# Patient Record
Sex: MALE | Race: WHITE | Employment: PART TIME | URBAN - METROPOLITAN AREA
[De-identification: names, ages, dates, MRNs, and addresses within clinical notes are randomized per-mention and may not be internally consistent; named-entity substitution may affect disease eponyms.]

---

## 2021-01-31 ENCOUNTER — APPOINTMENT (OUTPATIENT)
Dept: GENERAL RADIOLOGY | Age: 41
End: 2021-01-31
Attending: EMERGENCY MEDICINE
Payer: OTHER GOVERNMENT

## 2021-01-31 ENCOUNTER — HOSPITAL ENCOUNTER (EMERGENCY)
Age: 41
Discharge: HOME | End: 2021-01-31
Attending: EMERGENCY MEDICINE
Payer: OTHER GOVERNMENT

## 2021-01-31 VITALS
HEART RATE: 90 BPM | OXYGEN SATURATION: 94 % | RESPIRATION RATE: 16 BRPM | WEIGHT: 273.59 LBS | DIASTOLIC BLOOD PRESSURE: 86 MMHG | SYSTOLIC BLOOD PRESSURE: 131 MMHG | TEMPERATURE: 98.2 F

## 2021-01-31 DIAGNOSIS — S93.402A MODERATE ANKLE SPRAIN, LEFT, INITIAL ENCOUNTER: Primary | ICD-10-CM

## 2021-01-31 PROCEDURE — 73610 X-RAY EXAM OF ANKLE: CPT

## 2021-01-31 PROCEDURE — 96374 THER/PROPH/DIAG INJ IV PUSH: CPT

## 2021-01-31 PROCEDURE — 99283 EMERGENCY DEPT VISIT LOW MDM: CPT

## 2021-01-31 PROCEDURE — 96372 THER/PROPH/DIAG INJ SC/IM: CPT

## 2021-01-31 PROCEDURE — 74011250636 HC RX REV CODE- 250/636: Performed by: EMERGENCY MEDICINE

## 2021-01-31 PROCEDURE — 90791 PSYCH DIAGNOSTIC EVALUATION: CPT

## 2021-01-31 RX ORDER — KETOROLAC TROMETHAMINE 30 MG/ML
30 INJECTION, SOLUTION INTRAMUSCULAR; INTRAVENOUS
Status: COMPLETED | OUTPATIENT
Start: 2021-01-31 | End: 2021-01-31

## 2021-01-31 RX ORDER — NAPROXEN 500 MG/1
500 TABLET ORAL 2 TIMES DAILY WITH MEALS
Qty: 20 TAB | Refills: 0 | Status: SHIPPED | OUTPATIENT
Start: 2021-01-31 | End: 2021-02-10

## 2021-01-31 RX ADMIN — KETOROLAC TROMETHAMINE 30 MG: 30 INJECTION, SOLUTION INTRAMUSCULAR at 15:44

## 2021-01-31 NOTE — ED TRIAGE NOTES
Triage Note: Patient complains of left lateral ankle pain after jumping off a 4 foot stage last night. +swelling noted.

## 2021-01-31 NOTE — ED NOTES
The patient left the Emergency Department ambulatory with crutches, alert and oriented and in no acute distress. The patient was encouraged to call or return to the ED for worsening issues or problems and was encouraged to schedule a follow up appointment for continuing care.      The patient verbalized understanding of discharge instructions and prescriptions, all questions were answered. The patient has no further concerns at this time.

## 2021-02-01 NOTE — ED PROVIDER NOTES
17-year-old male with history of HTN, kidney stones, TBI, presents to the emergency department stating that last night while playing show he jumped off of a approximately 4 foot high stage and landed twisting his left ankle in an inversion type mechanism. Since then he has had progressively worsening ankle swelling and bruising and significant pain with any weightbearing. He states that he took a dose of ibuprofen earlier this morning which slightly improved his symptoms but has not taken anything since then. He notes a remote history of a prior ankle fracture which was treated nonoperatively with a fracture boot. He denies any other injuries head injury or any other medical concerns. Ankle Pain   Pertinent negatives include no numbness, no back pain and no neck pain. Past Medical History:   Diagnosis Date    Hypertension     Kidney stone     TBI (traumatic brain injury) ([facility] [address]        Past Surgical History:   Procedure Laterality Date    HX LITHOTRIPSY           History reviewed. No pertinent family history.     Social History     Socioeconomic History    Marital status:      Spouse name: Not on file    Number of children: Not on file    Years of education: Not on file    Highest education level: Not on file   Occupational History    Not on file   Social Needs    Financial resource strain: Not on file    Food insecurity     Worry: Not on file     Inability: Not on file    Transportation needs     Medical: Not on file     Non-medical: Not on file   Tobacco Use    Smoking status: Never Smoker    Smokeless tobacco: Never Used   Substance and Sexual Activity    Alcohol use: Yes     Comment: monthly    Drug use: Yes     Types: Marijuana    Sexual activity: Not on file   Lifestyle    Physical activity     Days per week: Not on file     Minutes per session: Not on file    Stress: Not on file   Relationships    Social connections     Talks on phone: Not on file     Gets together: Not on file     Attends Judaism service: Not on file     Active member of club or organization: Not on file     Attends meetings of clubs or organizations: Not on file     Relationship status: Not on file    Intimate partner violence     Fear of current or ex partner: Not on file     Emotionally abused: Not on file     Physically abused: Not on file     Forced sexual activity: Not on file   Other Topics Concern    Not on file   Social History Narrative    Not on file         ALLERGIES: Patient has no known allergies. Review of Systems   Constitutional: Positive for activity change. Negative for appetite change, chills and fever. HENT: Negative for congestion, rhinorrhea, sinus pain, sneezing and sore throat. Eyes: Negative for photophobia and visual disturbance. Respiratory: Negative for cough and shortness of breath. Cardiovascular: Negative for chest pain. Gastrointestinal: Negative for abdominal pain, blood in stool, constipation, diarrhea, nausea and vomiting. Genitourinary: Negative for difficulty urinating, dysuria, flank pain, hematuria, penile pain and testicular pain. Musculoskeletal: Positive for arthralgias (Left ankle), gait problem and joint swelling. Negative for back pain, myalgias and neck pain. Skin: Positive for color change. Negative for rash and wound. Neurological: Negative for syncope, weakness, light-headedness, numbness and headaches. Psychiatric/Behavioral: Negative for self-injury and suicidal ideas. All other systems reviewed and are negative. Vitals:    01/31/21 1527 01/31/21 1530 01/31/21 1600   BP: (!) 155/104 (!) 144/96 131/86   Pulse: (!) 101 (!) 102 90   Resp: 16 16 16   Temp: 98.2 °F (36.8 °C)     SpO2: 97% 97% 94%   Weight: 124.1 kg (273 lb 9.5 oz)              Physical Exam  Vitals signs and nursing note reviewed. Constitutional:       General: He is not in acute distress. Appearance: Normal appearance. He is well-developed.  He is not diaphoretic. HENT:      Head: Normocephalic and atraumatic. Nose: Nose normal.   Eyes:      Extraocular Movements: Extraocular movements intact. Conjunctiva/sclera: Conjunctivae normal.      Pupils: Pupils are equal, round, and reactive to light. Neck:      Musculoskeletal: Neck supple. Cardiovascular:      Rate and Rhythm: Normal rate and regular rhythm. Heart sounds: Normal heart sounds. Pulmonary:      Effort: Pulmonary effort is normal.      Breath sounds: Normal breath sounds. Abdominal:      General: There is no distension. Palpations: Abdomen is soft. Tenderness: There is no abdominal tenderness. Musculoskeletal:         General: Swelling, tenderness and signs of injury present. Left knee: Normal.      Left ankle: He exhibits decreased range of motion, swelling and ecchymosis. He exhibits no deformity, no laceration and normal pulse. Tenderness. Lateral malleolus and AITFL tenderness found. No medial malleolus, no head of 5th metatarsal and no proximal fibula tenderness found. Achilles tendon normal.   Skin:     General: Skin is warm and dry. Neurological:      General: No focal deficit present. Mental Status: He is alert and oriented to person, place, and time. Cranial Nerves: No cranial nerve deficit. Sensory: No sensory deficit. Motor: No weakness. Coordination: Coordination normal.          MDM   42-year-old male presents with swelling and ecchymosis predominantly over the lateral malleolus with decreased range of motion of left ankle concerning for moderate ankle sprain versus ankle fracture. Patient is afebrile with vital signs stable. Given dose of Toradol in the ED. X-rays of left ankle were reviewed by myself and read by radiology showing no evidence of acute fracture or dislocation. Likely moderate ankle sprain. He was given ankle brace and crutches and Rx Naprosyn.   He was recommended range of motion exercises and gradual reintroduction of normal weightbearing. Recommended orthopedic follow-up for further evaluation as needed despite these medications and time. This plan was discussed with patient at the bedside and he stated both understanding and agreement.     Procedures

## 2021-07-15 ENCOUNTER — APPOINTMENT (OUTPATIENT)
Dept: CT IMAGING | Age: 41
End: 2021-07-15
Attending: EMERGENCY MEDICINE
Payer: MEDICARE

## 2021-07-15 ENCOUNTER — HOSPITAL ENCOUNTER (EMERGENCY)
Age: 41
Discharge: HOME | End: 2021-07-16
Attending: EMERGENCY MEDICINE
Payer: MEDICARE

## 2021-07-15 DIAGNOSIS — R41.3 MEMORY DIFFICULTIES: ICD-10-CM

## 2021-07-15 DIAGNOSIS — Z87.820 HISTORY OF TRAUMATIC BRAIN INJURY: ICD-10-CM

## 2021-07-15 DIAGNOSIS — R40.4 TRANSIENT ALTERATION OF AWARENESS: Primary | ICD-10-CM

## 2021-07-15 PROCEDURE — 99283 EMERGENCY DEPT VISIT LOW MDM: CPT

## 2021-07-15 PROCEDURE — 70450 CT HEAD/BRAIN W/O DYE: CPT

## 2021-07-16 VITALS
RESPIRATION RATE: 16 BRPM | BODY MASS INDEX: 39.33 KG/M2 | WEIGHT: 274.69 LBS | SYSTOLIC BLOOD PRESSURE: 142 MMHG | OXYGEN SATURATION: 95 % | HEIGHT: 70 IN | DIASTOLIC BLOOD PRESSURE: 100 MMHG | TEMPERATURE: 98.3 F | HEART RATE: 91 BPM

## 2021-07-16 NOTE — ED TRIAGE NOTES
Pt states that he has a hx of a TBI in 2005 with some memory loss. Pt states that memory issues have become worse in the past 2 months and his PCP wanted him to be scanned. No new trauma.

## 2021-07-16 NOTE — ED PROVIDER NOTES
The history is provided by the patient. Memory Loss   This is a new problem. Episode onset: last 2 months short term memory difficulties have worsened and recently he had an episode where he was disoriented while dricing and did not know where he was, had to use phone GPS to get home. The problem has been resolved. Pertinent negatives include no somnolence, no seizures, no unresponsiveness and no hallucinations. Mental status baseline is normal.  Risk factors: history of TBI. Past Medical History:   Diagnosis Date    Hypertension     Kidney stone     TBI (traumatic brain injury) ([facility] [address]        Past Surgical History:   Procedure Laterality Date    HX LITHOTRIPSY           History reviewed. No pertinent family history. Social History     Socioeconomic History    Marital status:      Spouse name: Not on file    Number of children: Not on file    Years of education: Not on file    Highest education level: Not on file   Occupational History    Not on file   Tobacco Use    Smoking status: Never Smoker    Smokeless tobacco: Never Used   Substance and Sexual Activity    Alcohol use: Yes     Comment: monthly    Drug use: Yes     Types: Marijuana    Sexual activity: Not on file   Other Topics Concern    Not on file   Social History Narrative    Not on file     Social Determinants of Health     Financial Resource Strain:     Difficulty of Paying Living Expenses:    Food Insecurity:     Worried About Running Out of Food in the Last Year:     [address] in the Last Year:    Transportation Needs:     Lack of Transportation (Medical):      Lack of Transportation (Non-Medical):    Physical Activity:     Days of Exercise per Week:     Minutes of Exercise per Session:    Stress:     Feeling of Stress :    Social Connections:     Frequency of Communication with Friends and Family:     Frequency of Social Gatherings with Friends and Family:     Attends Gnosticist Services:     Active Member of Clubs or Organizations:     Attends Club or Organization Meetings:     Marital Status:    Intimate Partner Violence:     Fear of Current or Ex-Partner:     Emotionally Abused:     Physically Abused:     Sexually Abused: ALLERGIES: Patient has no known allergies. Review of Systems   Neurological: Negative for seizures. Psychiatric/Behavioral: Positive for memory loss. Negative for hallucinations. All other systems reviewed and are negative. Vitals:    07/15/21 2314 07/15/21 2315 07/15/21 2345 07/15/21 2356   BP:  (!) 158/100 (!) 142/100    Pulse:  91     Resp:  16     Temp:  98.3 °F (36.8 °C)     SpO2:  95% 95% 95%   Weight: 124.6 kg (274 lb 11.1 oz)      Height: 5' 10\" (1.778 m)               Physical Exam  Vitals and nursing note reviewed. Constitutional:       General: He is not in acute distress. Appearance: He is well-developed. HENT:      Head: Normocephalic and atraumatic. Eyes:      Extraocular Movements: Extraocular movements intact. Right eye: No nystagmus. Left eye: No nystagmus. Conjunctiva/sclera: Conjunctivae normal.   Neck:      Trachea: No tracheal deviation. Cardiovascular:      Rate and Rhythm: Normal rate and regular rhythm. Pulmonary:      Effort: Pulmonary effort is normal. No respiratory distress. Abdominal:      General: There is no distension. Musculoskeletal:         General: No deformity. Normal range of motion. Cervical back: Neck supple. Skin:     General: Skin is warm and dry. Neurological:      Mental Status: He is alert and oriented to person, place, and time. Cranial Nerves: Cranial nerves are intact. No cranial nerve deficit. Sensory: Sensation is intact. Motor: Motor function is intact. Coordination: Finger-Nose-Finger Test and Heel to [place] keshawn Test normal. Rapid alternating movements normal.      Gait: Gait is intact.    Psychiatric:         Behavior: Behavior normal.          MDM     41 y.o. male presents with memory difficulties which have progressively worsened over the last few weeks. He has had some headaches as well. He had a recent episode of disorientation while driving and he talked to his PCP who recommended neuroimaging. With history of TBI what he likely needs is MR brain wwo contrast for more detailed evaluation and neurology follow up. 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 Plan to follow up with PCP as needed and return precautions discussed for worsening or new concerning symptoms.      Procedures

## 2021-12-09 ENCOUNTER — HOSPITAL ENCOUNTER (INPATIENT)
Age: 41
LOS: 7 days | Discharge: HOME | DRG: 177 | End: 2021-12-16
Attending: EMERGENCY MEDICINE | Admitting: FAMILY MEDICINE
Payer: MEDICARE

## 2021-12-09 ENCOUNTER — APPOINTMENT (OUTPATIENT)
Dept: GENERAL RADIOLOGY | Age: 41
DRG: 177 | End: 2021-12-09
Attending: EMERGENCY MEDICINE
Payer: MEDICARE

## 2021-12-09 DIAGNOSIS — R09.02 HYPOXIA: ICD-10-CM

## 2021-12-09 DIAGNOSIS — U07.1 COVID-19: Primary | ICD-10-CM

## 2021-12-09 DIAGNOSIS — E11.9 NEW ONSET TYPE 2 DIABETES MELLITUS (HCC): ICD-10-CM

## 2021-12-09 PROBLEM — R06.02 SOB (SHORTNESS OF BREATH): Status: ACTIVE | Noted: 2021-12-09

## 2021-12-09 LAB
ALBUMIN SERPL-MCNC: 3.4 G/DL (ref 3.5–5)
ALBUMIN/GLOB SERPL: 0.9 {RATIO} (ref 1.1–2.2)
ALP SERPL-CCNC: 80 U/L (ref 45–117)
ALT SERPL-CCNC: 76 U/L (ref 12–78)
ANION GAP SERPL CALC-SCNC: 11 MMOL/L (ref 5–15)
AST SERPL-CCNC: 57 U/L (ref 15–37)
BASOPHILS # BLD: 0 K/UL (ref 0–0.1)
BASOPHILS NFR BLD: 0 % (ref 0–1)
BILIRUB SERPL-MCNC: 0.3 MG/DL (ref 0.2–1)
BUN SERPL-MCNC: 14 MG/DL (ref 6–20)
BUN/CREAT SERPL: 14 (ref 12–20)
CALCIUM SERPL-MCNC: 8.2 MG/DL (ref 8.5–10.1)
CHLORIDE SERPL-SCNC: 94 MMOL/L (ref 97–108)
CO2 SERPL-SCNC: 25 MMOL/L (ref 21–32)
COMMENT, HOLDF: NORMAL
COVID-19 RAPID TEST, COVR: DETECTED
CREAT SERPL-MCNC: 0.99 MG/DL (ref 0.7–1.3)
CRP SERPL-MCNC: 9.09 MG/DL
D DIMER PPP FEU-MCNC: 0.36 MG/L FEU (ref 0–0.65)
DIFFERENTIAL METHOD BLD: ABNORMAL
EOSINOPHIL # BLD: 0 K/UL (ref 0–0.4)
EOSINOPHIL NFR BLD: 0 % (ref 0–7)
ERYTHROCYTE [DISTWIDTH] IN BLOOD BY AUTOMATED COUNT: 13.2 % (ref 11.5–14.5)
FERRITIN SERPL-MCNC: 512 NG/ML (ref 26–388)
FIBRINOGEN PPP-MCNC: 485 MG/DL (ref 200–475)
GLOBULIN SER CALC-MCNC: 4 G/DL (ref 2–4)
GLUCOSE SERPL-MCNC: 198 MG/DL (ref 65–100)
HCT VFR BLD AUTO: 40.8 % (ref 36.6–50.3)
HGB BLD-MCNC: 13.8 G/DL (ref 12.1–17)
IMM GRANULOCYTES # BLD AUTO: 0 K/UL (ref 0–0.04)
IMM GRANULOCYTES NFR BLD AUTO: 1 % (ref 0–0.5)
LDH SERPL L TO P-CCNC: 350 U/L (ref 85–241)
LYMPHOCYTES # BLD: 1 K/UL (ref 0.8–3.5)
LYMPHOCYTES NFR BLD: 17 % (ref 12–49)
MAGNESIUM SERPL-MCNC: 2 MG/DL (ref 1.6–2.4)
MCH RBC QN AUTO: 27.9 PG (ref 26–34)
MCHC RBC AUTO-ENTMCNC: 33.8 G/DL (ref 30–36.5)
MCV RBC AUTO: 82.6 FL (ref 80–99)
MONOCYTES # BLD: 0.2 K/UL (ref 0–1)
MONOCYTES NFR BLD: 4 % (ref 5–13)
NEUTS SEG # BLD: 4.7 K/UL (ref 1.8–8)
NEUTS SEG NFR BLD: 78 % (ref 32–75)
NRBC # BLD: 0 K/UL (ref 0–0.01)
NRBC BLD-RTO: 0 PER 100 WBC
PLATELET # BLD AUTO: 220 K/UL (ref 150–400)
PMV BLD AUTO: 9.9 FL (ref 8.9–12.9)
POTASSIUM SERPL-SCNC: 3.8 MMOL/L (ref 3.5–5.1)
PROCALCITONIN SERPL-MCNC: 0.07 NG/ML
PROT SERPL-MCNC: 7.4 G/DL (ref 6.4–8.2)
RBC # BLD AUTO: 4.94 M/UL (ref 4.1–5.7)
SAMPLES BEING HELD,HOLD: NORMAL
SODIUM SERPL-SCNC: 130 MMOL/L (ref 136–145)
SOURCE, COVRS: ABNORMAL
TROPONIN-HIGH SENSITIVITY: 12 NG/L (ref 0–76)
WBC # BLD AUTO: 6 K/UL (ref 4.1–11.1)

## 2021-12-09 PROCEDURE — 96374 THER/PROPH/DIAG INJ IV PUSH: CPT

## 2021-12-09 PROCEDURE — 83615 LACTATE (LD) (LDH) ENZYME: CPT

## 2021-12-09 PROCEDURE — 74011250637 HC RX REV CODE- 250/637: Performed by: STUDENT IN AN ORGANIZED HEALTH CARE EDUCATION/TRAINING PROGRAM

## 2021-12-09 PROCEDURE — 65660000000 HC RM CCU STEPDOWN

## 2021-12-09 PROCEDURE — 84484 ASSAY OF TROPONIN QUANT: CPT

## 2021-12-09 PROCEDURE — 85384 FIBRINOGEN ACTIVITY: CPT

## 2021-12-09 PROCEDURE — 74011250637 HC RX REV CODE- 250/637: Performed by: INTERNAL MEDICINE

## 2021-12-09 PROCEDURE — 96375 TX/PRO/DX INJ NEW DRUG ADDON: CPT

## 2021-12-09 PROCEDURE — 80053 COMPREHEN METABOLIC PANEL: CPT

## 2021-12-09 PROCEDURE — 83735 ASSAY OF MAGNESIUM: CPT

## 2021-12-09 PROCEDURE — 74011250636 HC RX REV CODE- 250/636: Performed by: EMERGENCY MEDICINE

## 2021-12-09 PROCEDURE — 74011250637 HC RX REV CODE- 250/637: Performed by: FAMILY MEDICINE

## 2021-12-09 PROCEDURE — 74011250636 HC RX REV CODE- 250/636: Performed by: FAMILY MEDICINE

## 2021-12-09 PROCEDURE — 93005 ELECTROCARDIOGRAM TRACING: CPT

## 2021-12-09 PROCEDURE — 99284 EMERGENCY DEPT VISIT MOD MDM: CPT

## 2021-12-09 PROCEDURE — 86140 C-REACTIVE PROTEIN: CPT

## 2021-12-09 PROCEDURE — 71045 X-RAY EXAM CHEST 1 VIEW: CPT

## 2021-12-09 PROCEDURE — 84145 PROCALCITONIN (PCT): CPT

## 2021-12-09 PROCEDURE — 85025 COMPLETE CBC W/AUTO DIFF WBC: CPT

## 2021-12-09 PROCEDURE — 87635 SARS-COV-2 COVID-19 AMP PRB: CPT

## 2021-12-09 PROCEDURE — 85379 FIBRIN DEGRADATION QUANT: CPT

## 2021-12-09 PROCEDURE — 82728 ASSAY OF FERRITIN: CPT

## 2021-12-09 RX ORDER — LANOLIN ALCOHOL/MO/W.PET/CERES
6 CREAM (GRAM) TOPICAL
Status: DISCONTINUED | OUTPATIENT
Start: 2021-12-09 | End: 2021-12-16 | Stop reason: HOSPADM

## 2021-12-09 RX ORDER — KETOROLAC TROMETHAMINE 30 MG/ML
15 INJECTION, SOLUTION INTRAMUSCULAR; INTRAVENOUS
Status: COMPLETED | OUTPATIENT
Start: 2021-12-09 | End: 2021-12-09

## 2021-12-09 RX ORDER — GUAIFENESIN 100 MG/5ML
100 SOLUTION ORAL
Status: DISCONTINUED | OUTPATIENT
Start: 2021-12-09 | End: 2021-12-10

## 2021-12-09 RX ORDER — ACETAMINOPHEN 500 MG
1000 TABLET ORAL
Status: COMPLETED | OUTPATIENT
Start: 2021-12-09 | End: 2021-12-09

## 2021-12-09 RX ORDER — DEXAMETHASONE SODIUM PHOSPHATE 4 MG/ML
6 INJECTION, SOLUTION INTRA-ARTICULAR; INTRALESIONAL; INTRAMUSCULAR; INTRAVENOUS; SOFT TISSUE ONCE
Status: COMPLETED | OUTPATIENT
Start: 2021-12-09 | End: 2021-12-09

## 2021-12-09 RX ORDER — SODIUM CHLORIDE 0.9 % (FLUSH) 0.9 %
5-40 SYRINGE (ML) INJECTION EVERY 8 HOURS
Status: DISCONTINUED | OUTPATIENT
Start: 2021-12-09 | End: 2021-12-16 | Stop reason: HOSPADM

## 2021-12-09 RX ORDER — OXYCODONE HYDROCHLORIDE 5 MG/1
5 TABLET ORAL
Status: DISCONTINUED | OUTPATIENT
Start: 2021-12-09 | End: 2021-12-10

## 2021-12-09 RX ORDER — LISINOPRIL 5 MG/1
5 TABLET ORAL DAILY
Status: DISCONTINUED | OUTPATIENT
Start: 2021-12-10 | End: 2021-12-16 | Stop reason: HOSPADM

## 2021-12-09 RX ORDER — ONDANSETRON 4 MG/1
4 TABLET, ORALLY DISINTEGRATING ORAL
Status: DISCONTINUED | OUTPATIENT
Start: 2021-12-09 | End: 2021-12-16 | Stop reason: HOSPADM

## 2021-12-09 RX ORDER — VENLAFAXINE HYDROCHLORIDE 150 MG/1
300 CAPSULE, EXTENDED RELEASE ORAL
Status: DISCONTINUED | OUTPATIENT
Start: 2021-12-10 | End: 2021-12-16 | Stop reason: HOSPADM

## 2021-12-09 RX ORDER — ONDANSETRON 2 MG/ML
4 INJECTION INTRAMUSCULAR; INTRAVENOUS
Status: DISCONTINUED | OUTPATIENT
Start: 2021-12-09 | End: 2021-12-16 | Stop reason: HOSPADM

## 2021-12-09 RX ORDER — SODIUM CHLORIDE 0.9 % (FLUSH) 0.9 %
5-40 SYRINGE (ML) INJECTION AS NEEDED
Status: DISCONTINUED | OUTPATIENT
Start: 2021-12-09 | End: 2021-12-16 | Stop reason: HOSPADM

## 2021-12-09 RX ORDER — ACETAMINOPHEN 650 MG/1
650 SUPPOSITORY RECTAL
Status: DISCONTINUED | OUTPATIENT
Start: 2021-12-09 | End: 2021-12-16 | Stop reason: HOSPADM

## 2021-12-09 RX ORDER — MORPHINE SULFATE 4 MG/ML
4 INJECTION INTRAVENOUS
Status: COMPLETED | OUTPATIENT
Start: 2021-12-09 | End: 2021-12-09

## 2021-12-09 RX ORDER — ENOXAPARIN SODIUM 100 MG/ML
40 INJECTION SUBCUTANEOUS EVERY 12 HOURS
Status: DISCONTINUED | OUTPATIENT
Start: 2021-12-09 | End: 2021-12-16 | Stop reason: HOSPADM

## 2021-12-09 RX ORDER — ACETAMINOPHEN 325 MG/1
650 TABLET ORAL
Status: DISCONTINUED | OUTPATIENT
Start: 2021-12-09 | End: 2021-12-16 | Stop reason: HOSPADM

## 2021-12-09 RX ORDER — KETOROLAC TROMETHAMINE 30 MG/ML
30 INJECTION, SOLUTION INTRAMUSCULAR; INTRAVENOUS
Status: DISPENSED | OUTPATIENT
Start: 2021-12-09 | End: 2021-12-14

## 2021-12-09 RX ORDER — SODIUM CHLORIDE 9 MG/ML
150 INJECTION, SOLUTION INTRAVENOUS ONCE
Status: COMPLETED | OUTPATIENT
Start: 2021-12-09 | End: 2021-12-09

## 2021-12-09 RX ADMIN — MORPHINE SULFATE 4 MG: 4 INJECTION INTRAVENOUS at 11:45

## 2021-12-09 RX ADMIN — ENOXAPARIN SODIUM 40 MG: 100 INJECTION SUBCUTANEOUS at 21:30

## 2021-12-09 RX ADMIN — Medication 10 ML: at 21:32

## 2021-12-09 RX ADMIN — KETOROLAC TROMETHAMINE 15 MG: 30 INJECTION, SOLUTION INTRAMUSCULAR; INTRAVENOUS at 11:44

## 2021-12-09 RX ADMIN — SODIUM CHLORIDE 150 ML/HR: 9 INJECTION, SOLUTION INTRAVENOUS at 11:46

## 2021-12-09 RX ADMIN — OXYCODONE 5 MG: 5 TABLET ORAL at 21:30

## 2021-12-09 RX ADMIN — DEXAMETHASONE SODIUM PHOSPHATE 6 MG: 4 INJECTION, SOLUTION INTRAMUSCULAR; INTRAVENOUS at 11:44

## 2021-12-09 RX ADMIN — KETOROLAC TROMETHAMINE 30 MG: 30 INJECTION, SOLUTION INTRAMUSCULAR; INTRAVENOUS at 23:32

## 2021-12-09 RX ADMIN — GUAIFENESIN 100 MG: 200 SOLUTION ORAL at 23:32

## 2021-12-09 RX ADMIN — ACETAMINOPHEN 1000 MG: 500 TABLET ORAL at 17:11

## 2021-12-09 RX ADMIN — Medication 6 MG: at 23:32

## 2021-12-09 NOTE — ED NOTES
TRANSFER - OUT REPORT:    Verbal report given to Concepcion Miller RN(name) on [employer]  being transferred to 216(unit) for routine progression of care       Report consisted of patients Situation, Background, Assessment and   Recommendations(SBAR). Information from the following report(s) SBAR, ED Summary, [facility] and Recent Results was reviewed with the receiving nurse. Lines:   Peripheral IV 12/09/21 Left Hand (Active)   Site Assessment Clean, dry, & intact 12/09/21 1105   Phlebitis Assessment 0 12/09/21 1105   Infiltration Assessment 0 12/09/21 1105   Dressing Status Clean, dry, & intact 12/09/21 1105   Hub Color/Line Status Pink 12/09/21 1105        Opportunity for questions and clarification was provided.       Patient transported with:  [facility]

## 2021-12-09 NOTE — ED PROVIDER NOTES
Patient is a 44-year-old male with past medical history significant for hypertension, renal stones and prior TBI who presents emergency department for about a week of upper respiratory symptoms and severe myalgias. He states he is not vaccinated. He states he is not against getting vaccine he just never was able to set it up. States his son had confirmed Covid as well. Patient states that he has been feeling short of breath but that this got quite a bit worse last night. He states he had significant difficulty today prompting him to come to the emergency department. Past Medical History:   Diagnosis Date    Hypertension     Kidney stone     TBI (traumatic brain injury) ([facility] [address]        Past Surgical History:   Procedure Laterality Date    HX LITHOTRIPSY           History reviewed. No pertinent family history. Social History     Socioeconomic History    Marital status:      Spouse name: Not on file    Number of children: Not on file    Years of education: Not on file    Highest education level: Not on file   Occupational History    Not on file   Tobacco Use    Smoking status: Never Smoker    Smokeless tobacco: Never Used   Substance and Sexual Activity    Alcohol use: Yes     Comment: monthly    Drug use: Yes     Types: Marijuana    Sexual activity: Not on file   Other Topics Concern    Not on file   Social History Narrative    Not on file     Social Determinants of Health     Financial Resource Strain:     Difficulty of Paying Living Expenses: Not on file   Food Insecurity:     Worried About Running Out of Food in the Last Year: Not on file    [place] of Food in the Last Year: Not on file   Transportation Needs:     Lack of Transportation (Medical): Not on file    Lack of Transportation (Non-Medical):  Not on file   Physical Activity:     Days of Exercise per Week: Not on file    Minutes of Exercise per Session: Not on file   Stress:     Feeling of Stress : Not on file Social Connections:     Frequency of Communication with Friends and Family: Not on file    Frequency of Social Gatherings with Friends and Family: Not on file    Attends Yazidism Services: Not on file    Active Member of Clubs or Organizations: Not on file    Attends Club or Organization Meetings: Not on file    Marital Status: Not on file   Intimate Partner Violence:     Fear of Current or Ex-Partner: Not on file    Emotionally Abused: Not on file    Physically Abused: Not on file    Sexually Abused: Not on file   Housing Stability:     Unable to Pay for Housing in the Last Year: Not on file    Number of [place] in the Last Year: Not on file    Unstable Housing in the Last Year: Not on file         ALLERGIES: Patient has no known allergies. Review of Systems   Constitutional: Positive for activity change, appetite change, chills, fatigue and fever. HENT: Negative for drooling. Eyes: Negative for photophobia. Respiratory: Positive for cough and shortness of breath. Cardiovascular: Negative for chest pain and leg swelling. Gastrointestinal: Negative for abdominal pain and vomiting. Genitourinary: Negative for dysuria. Musculoskeletal: Positive for myalgias. Skin: Negative for rash. Allergic/Immunologic: Negative for immunocompromised state. Neurological: Negative. Negative for seizures and syncope. Hematological: Does not bruise/bleed easily. Psychiatric/Behavioral: Negative. Vitals:    12/09/21 1028 12/09/21 1032   BP: 123/74    Pulse: (!) 104    Resp: 28    Temp: 99.4 °F (37.4 °C)    SpO2: (!) 88% 93%   Weight: 122.2 kg (269 lb 6.4 oz)    Height: 5' 10\" (1.778 m)             Physical Exam  Vitals and nursing note reviewed. Constitutional:       Appearance: Normal appearance. He is ill-appearing. He is not toxic-appearing or diaphoretic. HENT:      Head: Normocephalic and atraumatic.       Left Ear: External ear normal.      Nose:      Comments: Mask in place  Eyes:      General: No scleral icterus. Cardiovascular:      Rate and Rhythm: Normal rate. Heart sounds: No friction rub. Pulmonary:      Effort: Respiratory distress present. Breath sounds: Rhonchi and rales present. Abdominal:      Palpations: Abdomen is soft. Musculoskeletal:      Cervical back: Neck supple. Right lower leg: No edema. Left lower leg: No edema. Skin:     General: Skin is warm and dry. Neurological:      Mental Status: He is alert and oriented to person, place, and time. Psychiatric:         Mood and Affect: Mood normal.         Behavior: Behavior normal.         Thought Content: Thought content normal.         Judgment: Judgment normal.          [facility]  ED Course as of 12/09/21 1145   Thu Dec 09, 2021   1137 EKG obtained at 1106 showing sinus rhythm, rate 98, normal intervals, no acute appearing findings.  [JE]      ED Course User Index  [JE] Dafne Jiménez MD       Critical Care  Performed by: Dafne Jiménez MD  Authorized by: Dafne Jiménez MD     Critical care provider statement:     Critical care time (minutes):  35    Critical care time was exclusive of:  Separately billable procedures and treating other patients    Critical care was necessary to treat or prevent imminent or life-threatening deterioration of the following conditions:  Respiratory failure    Critical care was time spent personally by me on the following activities:  Ordering and review of laboratory studies, ordering and review of radiographic studies, ordering and performing treatments and interventions, development of treatment plan with patient or surrogate, re-evaluation of patient's condition, pulse oximetry, evaluation of patient's response to treatment, examination of patient and obtaining history from patient or surrogate    I assumed direction of critical care for this patient from another provider in my specialty: no            Perfect Serve Consult for Admission  11:45 AM    ED Room Number: SER07/07  Patient Name and age:  Valdez Watson 39 y.o.  male  Working Diagnosis:   1. COVID-19    2. Hypoxia        COVID-19 Suspicion:  yes  Sepsis present:  no  Reassessment needed: no  Code Status:  Full Code  Readmission: no  Isolation Requirements:  yes  Recommended Level of Care:  telemetry  Department:[facility] Adult ED - 21   Other: Patient is a 70-year-old male with past medical history significant for hypertension who presents to the ER 1 week after starting to have symptoms of upper respiratory infection after known exposure to sun with Covid infection. Patient hypoxic upon arrival requiring nasal cannula O2. Covid pneumonia on chest x-ray. Given Decadron.

## 2021-12-09 NOTE — ED TRIAGE NOTES
Pt arrives with concern for COVId as his son tested + and pt has had cough and SOB x 1 week. Pt is not vaccinated for COVID.

## 2021-12-10 LAB
ALBUMIN SERPL-MCNC: 3.2 G/DL (ref 3.5–5)
ALBUMIN/GLOB SERPL: 0.7 {RATIO} (ref 1.1–2.2)
ALP SERPL-CCNC: 80 U/L (ref 45–117)
ALT SERPL-CCNC: 70 U/L (ref 12–78)
ANION GAP SERPL CALC-SCNC: 5 MMOL/L (ref 5–15)
APTT PPP: 29.3 SEC (ref 22.1–31)
AST SERPL-CCNC: 56 U/L (ref 15–37)
BASOPHILS # BLD: 0 K/UL (ref 0–0.1)
BASOPHILS NFR BLD: 1 % (ref 0–1)
BILIRUB SERPL-MCNC: 0.3 MG/DL (ref 0.2–1)
BUN SERPL-MCNC: 15 MG/DL (ref 6–20)
BUN/CREAT SERPL: 16 (ref 12–20)
CALCIUM SERPL-MCNC: 9.1 MG/DL (ref 8.5–10.1)
CHLORIDE SERPL-SCNC: 100 MMOL/L (ref 97–108)
CO2 SERPL-SCNC: 26 MMOL/L (ref 21–32)
CREAT SERPL-MCNC: 0.92 MG/DL (ref 0.7–1.3)
CRP SERPL-MCNC: 6.59 MG/DL (ref 0–0.6)
D DIMER PPP FEU-MCNC: 0.27 MG/L FEU (ref 0–0.65)
DIFFERENTIAL METHOD BLD: NORMAL
EOSINOPHIL # BLD: 0 K/UL (ref 0–0.4)
EOSINOPHIL NFR BLD: 0 % (ref 0–7)
ERYTHROCYTE [DISTWIDTH] IN BLOOD BY AUTOMATED COUNT: 13 % (ref 11.5–14.5)
EST. AVERAGE GLUCOSE BLD GHB EST-MCNC: 177 MG/DL
GLOBULIN SER CALC-MCNC: 4.5 G/DL (ref 2–4)
GLUCOSE BLD STRIP.AUTO-MCNC: 251 MG/DL (ref 65–117)
GLUCOSE SERPL-MCNC: 186 MG/DL (ref 65–100)
HBA1C MFR BLD: 7.8 % (ref 4–5.6)
HCT VFR BLD AUTO: 41.3 % (ref 36.6–50.3)
HGB BLD-MCNC: 13.9 G/DL (ref 12.1–17)
IMM GRANULOCYTES # BLD AUTO: 0 K/UL (ref 0–0.04)
IMM GRANULOCYTES NFR BLD AUTO: 0 % (ref 0–0.5)
INR PPP: 1 (ref 0.9–1.1)
LYMPHOCYTES # BLD: 1.3 K/UL (ref 0.8–3.5)
LYMPHOCYTES NFR BLD: 23 % (ref 12–49)
MAGNESIUM SERPL-MCNC: 2.4 MG/DL (ref 1.6–2.4)
MCH RBC QN AUTO: 28.1 PG (ref 26–34)
MCHC RBC AUTO-ENTMCNC: 33.7 G/DL (ref 30–36.5)
MCV RBC AUTO: 83.4 FL (ref 80–99)
MONOCYTES # BLD: 0.4 K/UL (ref 0–1)
MONOCYTES NFR BLD: 6 % (ref 5–13)
NEUTS SEG # BLD: 4.1 K/UL (ref 1.8–8)
NEUTS SEG NFR BLD: 70 % (ref 32–75)
NRBC # BLD: 0 K/UL (ref 0–0.01)
NRBC BLD-RTO: 0 PER 100 WBC
PLATELET # BLD AUTO: 234 K/UL (ref 150–400)
PMV BLD AUTO: 9.5 FL (ref 8.9–12.9)
POTASSIUM SERPL-SCNC: 4.1 MMOL/L (ref 3.5–5.1)
PROT SERPL-MCNC: 7.7 G/DL (ref 6.4–8.2)
PROTHROMBIN TIME: 10 SEC (ref 9–11.1)
RBC # BLD AUTO: 4.95 M/UL (ref 4.1–5.7)
SERVICE CMNT-IMP: ABNORMAL
SODIUM SERPL-SCNC: 131 MMOL/L (ref 136–145)
THERAPEUTIC RANGE,PTTT: NORMAL SECS (ref 58–77)
WBC # BLD AUTO: 5.9 K/UL (ref 4.1–11.1)

## 2021-12-10 PROCEDURE — 36415 COLL VENOUS BLD VENIPUNCTURE: CPT

## 2021-12-10 PROCEDURE — 83735 ASSAY OF MAGNESIUM: CPT

## 2021-12-10 PROCEDURE — 74011250637 HC RX REV CODE- 250/637: Performed by: HOSPITALIST

## 2021-12-10 PROCEDURE — 80053 COMPREHEN METABOLIC PANEL: CPT

## 2021-12-10 PROCEDURE — 74011250637 HC RX REV CODE- 250/637: Performed by: FAMILY MEDICINE

## 2021-12-10 PROCEDURE — 74011250637 HC RX REV CODE- 250/637: Performed by: INTERNAL MEDICINE

## 2021-12-10 PROCEDURE — 86140 C-REACTIVE PROTEIN: CPT

## 2021-12-10 PROCEDURE — XW0DXM6 INTRODUCTION OF BARICITINIB INTO MOUTH AND PHARYNX, EXTERNAL APPROACH, NEW TECHNOLOGY GROUP 6: ICD-10-PCS | Performed by: FAMILY MEDICINE

## 2021-12-10 PROCEDURE — 85610 PROTHROMBIN TIME: CPT

## 2021-12-10 PROCEDURE — 74011250636 HC RX REV CODE- 250/636: Performed by: FAMILY MEDICINE

## 2021-12-10 PROCEDURE — 85025 COMPLETE CBC W/AUTO DIFF WBC: CPT

## 2021-12-10 PROCEDURE — 85379 FIBRIN DEGRADATION QUANT: CPT

## 2021-12-10 PROCEDURE — 83036 HEMOGLOBIN GLYCOSYLATED A1C: CPT

## 2021-12-10 PROCEDURE — 74011636637 HC RX REV CODE- 636/637: Performed by: HOSPITALIST

## 2021-12-10 PROCEDURE — 65660000000 HC RM CCU STEPDOWN

## 2021-12-10 PROCEDURE — 82962 GLUCOSE BLOOD TEST: CPT

## 2021-12-10 PROCEDURE — 85730 THROMBOPLASTIN TIME PARTIAL: CPT

## 2021-12-10 RX ORDER — CODEINE PHOSPHATE AND GUAIFENESIN 10; 100 MG/5ML; MG/5ML
10 SOLUTION ORAL
Status: DISCONTINUED | OUTPATIENT
Start: 2021-12-10 | End: 2021-12-16 | Stop reason: HOSPADM

## 2021-12-10 RX ORDER — INSULIN LISPRO 100 [IU]/ML
INJECTION, SOLUTION INTRAVENOUS; SUBCUTANEOUS
Status: DISCONTINUED | OUTPATIENT
Start: 2021-12-10 | End: 2021-12-16 | Stop reason: HOSPADM

## 2021-12-10 RX ORDER — CODEINE PHOSPHATE AND GUAIFENESIN 10; 100 MG/5ML; MG/5ML
10 SOLUTION ORAL
Status: DISCONTINUED | OUTPATIENT
Start: 2021-12-10 | End: 2021-12-10 | Stop reason: SDUPTHER

## 2021-12-10 RX ORDER — DEXTROSE 50 % IN WATER (D50W) INTRAVENOUS SYRINGE
12.5-25 AS NEEDED
Status: DISCONTINUED | OUTPATIENT
Start: 2021-12-10 | End: 2021-12-16 | Stop reason: HOSPADM

## 2021-12-10 RX ORDER — OXYCODONE HYDROCHLORIDE 5 MG/1
10 TABLET ORAL
Status: DISCONTINUED | OUTPATIENT
Start: 2021-12-10 | End: 2021-12-16 | Stop reason: HOSPADM

## 2021-12-10 RX ORDER — MAGNESIUM SULFATE 100 %
4 CRYSTALS MISCELLANEOUS AS NEEDED
Status: DISCONTINUED | OUTPATIENT
Start: 2021-12-10 | End: 2021-12-13

## 2021-12-10 RX ORDER — BENZONATATE 100 MG/1
100 CAPSULE ORAL 3 TIMES DAILY
Status: DISCONTINUED | OUTPATIENT
Start: 2021-12-10 | End: 2021-12-16 | Stop reason: HOSPADM

## 2021-12-10 RX ADMIN — GUAIFENESIN AND CODEINE PHOSPHATE 10 ML: 100; 10 SOLUTION ORAL at 18:17

## 2021-12-10 RX ADMIN — OXYCODONE 10 MG: 5 TABLET ORAL at 14:06

## 2021-12-10 RX ADMIN — OXYCODONE 10 MG: 5 TABLET ORAL at 03:54

## 2021-12-10 RX ADMIN — BENZONATATE 100 MG: 100 CAPSULE ORAL at 21:23

## 2021-12-10 RX ADMIN — KETOROLAC TROMETHAMINE 30 MG: 30 INJECTION, SOLUTION INTRAMUSCULAR; INTRAVENOUS at 18:17

## 2021-12-10 RX ADMIN — Medication 10 ML: at 21:24

## 2021-12-10 RX ADMIN — Medication 10 ML: at 14:00

## 2021-12-10 RX ADMIN — OXYCODONE 10 MG: 5 TABLET ORAL at 23:07

## 2021-12-10 RX ADMIN — KETOROLAC TROMETHAMINE 30 MG: 30 INJECTION, SOLUTION INTRAMUSCULAR; INTRAVENOUS at 06:29

## 2021-12-10 RX ADMIN — BARICITINIB 4 MG: 2 TABLET, FILM COATED ORAL at 19:04

## 2021-12-10 RX ADMIN — DEXAMETHASONE 6 MG: 4 TABLET ORAL at 12:56

## 2021-12-10 RX ADMIN — BENZONATATE 100 MG: 100 CAPSULE ORAL at 18:17

## 2021-12-10 RX ADMIN — LISINOPRIL 5 MG: 5 TABLET ORAL at 10:18

## 2021-12-10 RX ADMIN — INSULIN LISPRO 3 UNITS: 100 INJECTION, SOLUTION INTRAVENOUS; SUBCUTANEOUS at 18:17

## 2021-12-10 RX ADMIN — KETOROLAC TROMETHAMINE 30 MG: 30 INJECTION, SOLUTION INTRAMUSCULAR; INTRAVENOUS at 12:56

## 2021-12-10 RX ADMIN — OXYCODONE 10 MG: 5 TABLET ORAL at 19:04

## 2021-12-10 RX ADMIN — GUAIFENESIN 100 MG: 200 SOLUTION ORAL at 10:18

## 2021-12-10 RX ADMIN — OXYCODONE 10 MG: 5 TABLET ORAL at 10:18

## 2021-12-10 RX ADMIN — ENOXAPARIN SODIUM 40 MG: 100 INJECTION SUBCUTANEOUS at 10:18

## 2021-12-10 RX ADMIN — GUAIFENESIN 100 MG: 200 SOLUTION ORAL at 03:56

## 2021-12-10 RX ADMIN — ENOXAPARIN SODIUM 40 MG: 100 INJECTION SUBCUTANEOUS at 21:23

## 2021-12-10 RX ADMIN — VENLAFAXINE HYDROCHLORIDE 300 MG: 150 CAPSULE, EXTENDED RELEASE ORAL at 06:30

## 2021-12-10 RX ADMIN — Medication 10 ML: at 06:30

## 2021-12-10 NOTE — PROGRESS NOTES
Problem: Gas Exchange - Impaired  Goal: Absence of hypoxia  Outcome: Progressing Towards Goal  Goal: Promote optimal lung function  Outcome: Progressing Towards Goal     Problem:  Body Temperature -  Risk of, Imbalanced  Goal: Complications related to the disease process, condition or treatment will be avoided or minimized  Outcome: Progressing Towards Goal     Problem: Isolation Precautions - Risk of Spread of Infection  Goal: Prevent transmission of infectious organism to others  Outcome: Progressing Towards Goal     Problem: Fatigue  Goal: Verbalize increase energy and improved vitality  Outcome: Progressing Towards Goal     Problem: Patient Education: Go to Patient Education Activity  Goal: Patient/Family Education  Outcome: Progressing Towards Goal

## 2021-12-10 NOTE — PROGRESS NOTES
BEDSIDE_VERBAL_RECORDED_WRITTEN:[id number]::\"Bedside\"} shift change report given to [address] [place] [address] (oncoming nurse) by Santa Richards (offgoing nurse). Report included the following information SBAR, ED Summary, Orders clarification.

## 2021-12-10 NOTE — H&P
[address] [facility] Adult  Hospitalist Group  History and Physical    Primary Care Provider: Jamal Bolton MD  Date of Service:  12/9/2021    Subjective:     Jolanta Rosas is a 39 y.o. male with a pmhx HTN,and TBI who presents with hypoxic respiratory failure 2/2 covid pneumonia. He is not vaccinated. His sx began 4 days ago and include myalgias, arthralgias, cough and fatigue. In the ED, VS were significant for spo2 88% with improvement to 93%  On 3lpm.  Labs were significant for Na 130, elevated inflammatory markers, and normal d-dimer. Rapid covid was positive. CXR showed non-specific bibasilar airspace disease. EKG shows NSR. In the ED, he received decaadron, tylenol, toradol, morphine, and IVF. Review of Systems:    All other review of systems were negative except for that written in the History of Present Illness. Past Medical History:   Diagnosis Date    Hypertension     Kidney stone     TBI (traumatic brain injury) ([facility] [address]       Past Surgical History:   Procedure Laterality Date    HX LITHOTRIPSY       Prior to Admission medications    Medication Sig Start Date End Date Taking? Authorizing Provider   venlafaxine HCl (EFFEXOR PO) Take  by mouth daily. Jamal Bolton MD   LISINOPRIL PO Take  by mouth daily. Jamal Bolton MD     No Known Allergies   History reviewed. No pertinent family history. SOCIAL HISTORY:  Patient resides at Home. Patient ambulates independently  Smoking history: occasional  Alcohol history: occasional    Objective:       Physical Exam:   Visit Vitals  /75 (BP 1 Location: Left arm, BP Patient Position: At rest)   Pulse 85   Temp 98.2 °F (36.8 °C)   Resp 20   Ht 5' 10\" (1.778 m)   Wt 122.2 kg (269 lb 6.4 oz)   SpO2 93%   BMI 38.66 kg/m²     General:  Alert, cooperative, no distress, appears stated age. Eyes:  Conjunctivae/corneas clear. EOMs intact. Ears:  Normal TMs and external ear canals both ears. Nose: Nares normal. Septum midline. Throat: Lips, mucosa, and tongue normal.    Neck: Supple, symmetrical, trachea midline. Back:   Symmetric, no curvature. ROM normal. No CVA tenderness. Lungs:   Clear to auscultation bilaterally. Chest wall:  No tenderness or deformity. Heart:  Regular rate and rhythm, S1, S2 normal, no murmur, click, rub or gallop. Abdomen:   Soft, non-tender. Bowel sounds normal.            Extremities: Extremities normal, atraumatic, no cyanosis or edema. Pulses: 2+ radial pulses   Skin: Skin color, texture, turgor normal. No rashes or lesions     ECG:  NSR    Data Review: All diagnostic labs and studies have been reviewed. Chest x-ray: non-specific bibasilar airspace disease    Assessment:     Active Problems:    SOB (shortness of breath) (12/9/2021)        Plan:     #Hypoxic Respiratory failure  #Covid pneumonia  Symptoms have been present for 4 days, rapid Covid positive  Supplemental oxygen.   Follow anticoagulation, and inflammatory markers  Start Decadron  Discussed use of remdesivir, he would like to talk to his wife prior to making a decision when using this medication  Anticoagulation per Covid protocol  Contact plus droplet precautions  Toradol for pain, Robitussin for cough, melatonin for sleep    #Hypertension  Continue home lisinopril    #Depression  Continue home Effexor    FEN: cardiac  dvt ppx: lovenox per covid protocol  MPOA: Jeanne Albert (spouse)  Code: Full    FUNCTIONAL STATUS PRIOR TO HOSPITALIZATION Ambulates Independently      Signed By: Aisha Lorenzo MD     December 9, 2021

## 2021-12-10 NOTE — PROGRESS NOTES
Transition of Care Plan   RUR- 6% Low Risk   DISPOSITION: The disposition plan is home with family assistance.  F/U with PCP/Specialist     Transport: Family    Per this Morning's IDR's it was reported by attending that patient may likely discharge home on Monday pending medical stability. CM will continue to provide updates as they become available. CM will continue to follow, provide support and assist with WILMAR needs as they arise.     NORIS Rangel, CRM, LMHP-e

## 2021-12-10 NOTE — PROGRESS NOTES
[address] [facility] Adult  Hospitalist Group  Progress note   Lyric Knox MD        Primary Care Provider: Other, MD Jamal  Date of Service:  12/10/2021     From H&P      Emanuel Street is a 39 y.o. male with a pmhx HTN,and TBI who presents with hypoxic respiratory failure 2/2 covid pneumonia  He is unvaccinated. His sx began 4 days ago and include myalgias, arthralgias, cough and fatigue.     In the ED, VS were significant for spo2 88% with improvement to 93%  On 3lpm.  Labs were significant for Na 130, elevated inflammatory markers, and normal d-dimer. Rapid covid was positive. CXR showed non-specific bibasilar airspace disease. EKG shows NSR.     In the ED, he received decaadron, tylenol, toradol, morphine, and IVF.        Subjectively    --Currently on 3 L/min. He is bothered by the cough. Appetite is poor. He confirmed with me given that he does not want Remdesevir        Assessment and plan    #Hypoxic Respiratory failure  #Covid pneumonia  Supplemental oxygen to keep SPO2 above 94%. Continue Decadron. Patient declined Remdesevir. Anticoagulation per Covid protocol. D-dimer very low  -CRP elevated, 9.09 on admission, he did not receive Actemra. CRP 6.59 today, will consult pharmacy for baricitinib. Contact plus droplet precautions  Other supportive therapy: Antitussives. -Encouraged to increase mobility, out of bed to chair, seen by edge of bed for every meal, prone as able  -     #Hypertension  Continue home lisinopril     #Depression  Continue home Effexor     #Hyperglycemia due to steroids and acute illness. Check A1c to see if he has undiagnosed type  -- Accu-Cheks along with Humalog correction scale     #Mild hyponatremia due to acute illness, poor oral intake. Encourage patient to increase oral intake, solid and liquids.   Would like to avoid IV fluids in the setting of Covid with hypoxia requiring oxygen.     Obese  Body mass index is 38.66 kg/m².     CODE STATUS: Full  VTE prophylaxis: Enoxaparin, dosed per COVID protocol  Disposition: Anticipate home, RUBEN> 48 hours     FUNCTIONAL STATUS PRIOR TO HOSPITALIZATION Ambulates Independently          Review of Systems:     All other review of systems were negative except for that written in the History of Present Illness.           Past Medical History:   Diagnosis Date    Hypertension      Kidney stone      TBI (traumatic brain injury) ([facility] [address]              Past Surgical History:   Procedure Laterality Date    HX LITHOTRIPSY                  Prior to Admission medications    Medication Sig Start Date End Date Taking? Authorizing Provider   venlafaxine HCl (EFFEXOR PO) Take  by mouth daily.     Yes Other, MD Jamal   LISINOPRIL PO Take  by mouth daily.     Yes Other, MD Jamal      No Known Allergies   History reviewed. No pertinent family history.      SOCIAL HISTORY:  Patient resides at Home. Patient ambulates independently  Smoking history: occasional  Alcohol history: occasional     Objective:         Physical Exam:   Visit Vitals  /86   Pulse 96   Temp 98.9 °F (37.2 °C)   Resp 18   Ht 5' 10\" (1.778 m)   Wt 122.2 kg (269 lb 6.4 oz)   SpO2 92%   BMI 38.66 kg/m²      GENERAL:      He looks uncomfortable but not in distress  HEENT:           Normocephalic, atraumatic, non icteric sclerae, non pallor conjuctivae, EOMs intact, PERRLA. NECK: Supple, trachea midline, no adenopathy, no thyromegally or tenderness, no carotid bruit and no JVD. LUNGS:           Diminished air entry basally. Accessory muscle use. HEART:            S1 and S2 well heard,RRR,  no murmur, click, rub or gallop. ABDOMEB:     Obese, soft, non-tender. Normoactive bowel sounds. No masses,  No organomegaly. EXTREMETIES:          Atraumatic, acyanotic, no edema  PULSES:         2+ and symmetric all extremities. SKIN:    No rashes or lesions  NEUROLOGY:            Alert and oriented to PPT, CNII-XII intact.  Motor and sensory exam grossly intact.           Data Review:    All diagnostic labs and studies have been reviewed.     Chest x-ray: non-specific bibasilar airspace disease        Signed By: Lonie Sandifer, MD      December 10, 2021

## 2021-12-10 NOTE — PROGRESS NOTES
Transition of Care: Anticipate discharge home  Patient lives with his wife and 4 children. RUR 4%    Cm contacted patient, introduced self, explained role and offered support. Patient lives with his wife and  4 children. Patient was independent prior to admission. Cm will continue to monitor home oxygen if needed.      Reason for Admission: COVID19-pneumonia                      RUR Score: 4%                  Plan for utilizing home health:  Not at this time      PCP: First and Last name:  Other, Phys, MD   Patient doesn't remember the doctors name he just goes to the same building but his MD changes frequently   Are you a current patient: Yes/No: Yes   Approximate date of last visit:    Can you participate in a virtual visit with your PCP:                     Current Advanced Directive/Advance Care Plan: No Order      Healthcare Decision Maker:   Click here to complete [address] including selection of the [address] Relationship (ie \"Primary\")             Primary Decision Maker: Swati Dasilva - [phone]

## 2021-12-10 NOTE — PROGRESS NOTES
[address] [facility] Adult  Hospitalist Group  Progress note   Lonie Sandifer, MD      Primary Care Provider: Other, MD Jamal  Date of Service:  12/10/2021    From H&P     Pb Cortes is a 39 y.o. male with a pmhx HTN,and TBI who presents with hypoxic respiratory failure 2/2 covid pneumonia  He is unvaccinated. His sx began 4 days ago and include myalgias, arthralgias, cough and fatigue. In the ED, VS were significant for spo2 88% with improvement to 93%  On 3lpm.  Labs were significant for Na 130, elevated inflammatory markers, and normal d-dimer. Rapid covid was positive. CXR showed non-specific bibasilar airspace disease. EKG shows NSR. In the ED, he received decaadron, tylenol, toradol, morphine, and IVF. Subjectively    --Currently on 3 L/min. He is bothered by the cough. Appetite is poor. He confirmed with me given that he does not want Remdesevir      Assessment and plan    #Hypoxic Respiratory failure  #Covid pneumonia  Supplemental oxygen to keep SPO2 above 94%. Continue Decadron. Patient declined Remdesevir. Anticoagulation per Covid protocol. D-dimer very low  -CRP elevated, 9.09 on admission, he did not receive Actemra. CRP 6.59 today, will consult pharmacy for baricitinib. Contact plus droplet precautions  Other supportive therapy: Antitussives. -Encouraged to increase mobility, out of bed to chair, seen by edge of bed for every meal, prone as able  -    #Hypertension  Continue home lisinopril    #Depression  Continue home Effexor    #Hyperglycemia due to steroids and acute illness. Check A1c to see if he has undiagnosed type  -- Accu-Cheks along with Humalog correction scale    #Mild hyponatremia due to acute illness, poor oral intake. Encourage patient to increase oral intake, solid and liquids. Would like to avoid IV fluids in the setting of Covid with hypoxia requiring oxygen. Obese  Body mass index is 38.66 kg/m².     CODE STATUS: Full  VTE prophylaxis: Enoxaparin, dosed per COVID protocol  Disposition: Anticipate home, RUBEN> 48 hours    FUNCTIONAL STATUS PRIOR TO HOSPITALIZATION Ambulates Independently        Review of Systems:    All other review of systems were negative except for that written in the History of Present Illness. Past Medical History:   Diagnosis Date    Hypertension     Kidney stone     TBI (traumatic brain injury) ([facility] [address]       Past Surgical History:   Procedure Laterality Date    HX LITHOTRIPSY       Prior to Admission medications    Medication Sig Start Date End Date Taking? Authorizing Provider   venlafaxine HCl (EFFEXOR PO) Take  by mouth daily. Yes Other, MD Jamal   LISINOPRIL PO Take  by mouth daily. Yes Other, MD Jamal     No Known Allergies   History reviewed. No pertinent family history. SOCIAL HISTORY:  Patient resides at Home. Patient ambulates independently  Smoking history: occasional  Alcohol history: occasional    Objective:       Physical Exam:   Visit Vitals  /86   Pulse 96   Temp 98.9 °F (37.2 °C)   Resp 18   Ht 5' 10\" (1.778 m)   Wt 122.2 kg (269 lb 6.4 oz)   SpO2 92%   BMI 38.66 kg/m²     GENERAL:  He looks uncomfortable but not in distress  HEENT:  Normocephalic, atraumatic, non icteric sclerae, non pallor conjuctivae, EOMs intact, PERRLA. NECK: Supple, trachea midline, no adenopathy, no thyromegally or tenderness, no carotid bruit and no JVD. LUNGS:   Diminished air entry basally. Accessory muscle use. HEART:   S1 and S2 well heard,RRR,  no murmur, click, rub or gallop. ABDOMEB:   Obese, soft, non-tender. Normoactive bowel sounds. No masses,  No organomegaly. EXTREMETIES:  Atraumatic, acyanotic, no edema  PULSES: 2+ and symmetric all extremities. SKIN:  No rashes or lesions  NEUROLOGY: Alert and oriented to PPT, CNII-XII intact. Motor and sensory exam grossly intact. Data Review: All diagnostic labs and studies have been reviewed.     Chest x-ray: non-specific bibasilar airspace disease      Signed By: Kiki Segura MD     December 10, 2021

## 2021-12-11 LAB
ANION GAP SERPL CALC-SCNC: 5 MMOL/L (ref 5–15)
BUN SERPL-MCNC: 16 MG/DL (ref 6–20)
BUN/CREAT SERPL: 16 (ref 12–20)
CALCIUM SERPL-MCNC: 8.8 MG/DL (ref 8.5–10.1)
CHLORIDE SERPL-SCNC: 98 MMOL/L (ref 97–108)
CO2 SERPL-SCNC: 26 MMOL/L (ref 21–32)
CREAT SERPL-MCNC: 0.98 MG/DL (ref 0.7–1.3)
CRP SERPL-MCNC: 8.02 MG/DL (ref 0–0.6)
D DIMER PPP FEU-MCNC: 0.43 MG/L FEU (ref 0–0.65)
GLUCOSE BLD STRIP.AUTO-MCNC: 134 MG/DL (ref 65–117)
GLUCOSE BLD STRIP.AUTO-MCNC: 154 MG/DL (ref 65–117)
GLUCOSE BLD STRIP.AUTO-MCNC: 183 MG/DL (ref 65–117)
GLUCOSE BLD STRIP.AUTO-MCNC: 248 MG/DL (ref 65–117)
GLUCOSE SERPL-MCNC: 186 MG/DL (ref 65–100)
POTASSIUM SERPL-SCNC: 4.4 MMOL/L (ref 3.5–5.1)
PROCALCITONIN SERPL-MCNC: 0.07 NG/ML
SERVICE CMNT-IMP: ABNORMAL
SODIUM SERPL-SCNC: 129 MMOL/L (ref 136–145)

## 2021-12-11 PROCEDURE — 74011250637 HC RX REV CODE- 250/637: Performed by: HOSPITALIST

## 2021-12-11 PROCEDURE — 85379 FIBRIN DEGRADATION QUANT: CPT

## 2021-12-11 PROCEDURE — 80048 BASIC METABOLIC PNL TOTAL CA: CPT

## 2021-12-11 PROCEDURE — 94760 N-INVAS EAR/PLS OXIMETRY 1: CPT

## 2021-12-11 PROCEDURE — 86140 C-REACTIVE PROTEIN: CPT

## 2021-12-11 PROCEDURE — 84145 PROCALCITONIN (PCT): CPT

## 2021-12-11 PROCEDURE — 74011250637 HC RX REV CODE- 250/637: Performed by: FAMILY MEDICINE

## 2021-12-11 PROCEDURE — 74011250636 HC RX REV CODE- 250/636: Performed by: FAMILY MEDICINE

## 2021-12-11 PROCEDURE — 65660000000 HC RM CCU STEPDOWN

## 2021-12-11 PROCEDURE — 82962 GLUCOSE BLOOD TEST: CPT

## 2021-12-11 PROCEDURE — 74011250637 HC RX REV CODE- 250/637: Performed by: INTERNAL MEDICINE

## 2021-12-11 PROCEDURE — 36415 COLL VENOUS BLD VENIPUNCTURE: CPT

## 2021-12-11 RX ORDER — ZINC SULFATE 50(220)MG
1 CAPSULE ORAL DAILY
Status: DISCONTINUED | OUTPATIENT
Start: 2021-12-12 | End: 2021-12-16 | Stop reason: HOSPADM

## 2021-12-11 RX ORDER — ASCORBIC ACID 500 MG
1000 TABLET ORAL 2 TIMES DAILY
Status: DISCONTINUED | OUTPATIENT
Start: 2021-12-11 | End: 2021-12-16 | Stop reason: HOSPADM

## 2021-12-11 RX ADMIN — OXYCODONE 10 MG: 5 TABLET ORAL at 18:49

## 2021-12-11 RX ADMIN — GUAIFENESIN AND CODEINE PHOSPHATE 10 ML: 100; 10 SOLUTION ORAL at 01:59

## 2021-12-11 RX ADMIN — ENOXAPARIN SODIUM 40 MG: 100 INJECTION SUBCUTANEOUS at 22:34

## 2021-12-11 RX ADMIN — BENZONATATE 100 MG: 100 CAPSULE ORAL at 15:34

## 2021-12-11 RX ADMIN — GUAIFENESIN AND CODEINE PHOSPHATE 10 ML: 100; 10 SOLUTION ORAL at 08:49

## 2021-12-11 RX ADMIN — KETOROLAC TROMETHAMINE 30 MG: 30 INJECTION, SOLUTION INTRAMUSCULAR; INTRAVENOUS at 08:49

## 2021-12-11 RX ADMIN — KETOROLAC TROMETHAMINE 30 MG: 30 INJECTION, SOLUTION INTRAMUSCULAR; INTRAVENOUS at 01:59

## 2021-12-11 RX ADMIN — OXYCODONE 10 MG: 5 TABLET ORAL at 13:07

## 2021-12-11 RX ADMIN — GUAIFENESIN AND CODEINE PHOSPHATE 10 ML: 100; 10 SOLUTION ORAL at 22:34

## 2021-12-11 RX ADMIN — Medication 10 ML: at 13:08

## 2021-12-11 RX ADMIN — Medication 10 ML: at 07:06

## 2021-12-11 RX ADMIN — GUAIFENESIN AND CODEINE PHOSPHATE 10 ML: 100; 10 SOLUTION ORAL at 15:34

## 2021-12-11 RX ADMIN — BENZONATATE 100 MG: 100 CAPSULE ORAL at 22:34

## 2021-12-11 RX ADMIN — OXYCODONE 10 MG: 5 TABLET ORAL at 08:48

## 2021-12-11 RX ADMIN — VENLAFAXINE HYDROCHLORIDE 300 MG: 150 CAPSULE, EXTENDED RELEASE ORAL at 07:06

## 2021-12-11 RX ADMIN — DEXAMETHASONE 6 MG: 4 TABLET ORAL at 13:07

## 2021-12-11 RX ADMIN — KETOROLAC TROMETHAMINE 30 MG: 30 INJECTION, SOLUTION INTRAMUSCULAR; INTRAVENOUS at 22:34

## 2021-12-11 RX ADMIN — OXYCODONE 10 MG: 5 TABLET ORAL at 04:10

## 2021-12-11 RX ADMIN — OXYCODONE HYDROCHLORIDE AND ACETAMINOPHEN 1000 MG: 500 TABLET ORAL at 18:49

## 2021-12-11 RX ADMIN — ACETAMINOPHEN 650 MG: 325 TABLET ORAL at 16:33

## 2021-12-11 RX ADMIN — Medication 10 ML: at 22:35

## 2021-12-11 RX ADMIN — LISINOPRIL 5 MG: 5 TABLET ORAL at 08:49

## 2021-12-11 RX ADMIN — KETOROLAC TROMETHAMINE 30 MG: 30 INJECTION, SOLUTION INTRAMUSCULAR; INTRAVENOUS at 16:33

## 2021-12-11 RX ADMIN — ENOXAPARIN SODIUM 40 MG: 100 INJECTION SUBCUTANEOUS at 08:49

## 2021-12-11 RX ADMIN — BARICITINIB 4 MG: 2 TABLET, FILM COATED ORAL at 08:49

## 2021-12-11 RX ADMIN — BENZONATATE 100 MG: 100 CAPSULE ORAL at 08:48

## 2021-12-11 NOTE — ROUTINE PROCESS
Bedside shift change report given to Abby Méndez (oncoming nurse) by Bo Kimble (offgoing nurse). Report included the following information SBAR, Kardex, Intake/Output, MAR and Recent Results.

## 2021-12-11 NOTE — PROGRESS NOTES
Bedside and Verbal shift change report given to Allison (oncoming nurse) by Mojgan Dixon (offgoing nurse). Report included the following information SBAR, Kardex, MAR and Recent Results.

## 2021-12-11 NOTE — PROGRESS NOTES
[address] [facility] Adult  Hospitalist Group  Progress note   Jose Paz MD        Primary Care Provider: Other, MD Jamal  Date of Service:  12/10/2021     From H&P      Rachel Valadez is a 39 y.o. male with a pmhx HTN,and TBI who presents with hypoxic respiratory failure 2/2 covid pneumonia  He is unvaccinated. His sx began 4 days ago and include myalgias, arthralgias, cough and fatigue.     In the ED, VS were significant for spo2 88% with improvement to 93%  On 3lpm.  Labs were significant for Na 130, elevated inflammatory markers, and normal d-dimer. Rapid covid was positive. CXR showed non-specific bibasilar airspace disease. EKG shows NSR.     In the ED, he received decaadron, tylenol, toradol, morphine, and IVF.        Subjectively    --Patient was sitting by the windowsill talking with family on the phone. His wife and children  outside by the lawn. His SPO2 was 88 to 89% on room air, he appears short of breath. I placed him back on oxygen, left him on 6 L/min. Discussed with RN.     Assessment and plan    #Hypoxic Respiratory failure  #Covid pneumonia  Supplemental oxygen to keep SPO2 above 94%. Continue Decadron. On baricitinib. Patient declined Remdesevir. Anticoagulation per Covid protocol. D-dimer very low  -CRP elevated, 9.09 on admission, he did not receive Actemra. Contact plus droplet precautions  Other supportive therapy: Antitussives. -Encouraged to increase mobility, out of bed to chair, seen by edge of bed for every meal, prone as able  -     #Hypertension  Continue home lisinopril     #Depression  Continue home Effexor     #Type II diabetes. A1c 7.8. Patient denied diagnosis prior to admission. -- Accu-Cheks along with Humalog correction scale  --He will need Metformin on discharge.     #Mild hyponatremia due to acute illness, poor oral intake. Encourage patient to increase oral intake, solid and liquids.   Would like to avoid IV fluids in the setting of Covid with hypoxia requiring oxygen.     Obese  Body mass index is 38.66 kg/m².     CODE STATUS: Full  VTE prophylaxis: Enoxaparin, dosed per COVID protocol  Disposition: Anticipate home, RUBEN> 48 hours     FUNCTIONAL STATUS PRIOR TO HOSPITALIZATION Ambulates Independently          Review of Systems:     All other review of systems were negative except for that written in the History of Present Illness.           Past Medical History:   Diagnosis Date    Hypertension      Kidney stone      TBI (traumatic brain injury) ([facility] [address]              Past Surgical History:   Procedure Laterality Date    HX LITHOTRIPSY                  Prior to Admission medications    Medication Sig Start Date End Date Taking? Authorizing Provider   venlafaxine HCl (EFFEXOR PO) Take  by mouth daily.     Yes Other, MD Jamal   LISINOPRIL PO Take  by mouth daily.     Yes Other, MD Jamal      No Known Allergies   History reviewed. No pertinent family history.      SOCIAL HISTORY:  Patient resides at Home. Patient ambulates independently  Smoking history: occasional  Alcohol history: occasional     Objective:         Physical Exam:   Visit Vitals  /86   Pulse 96   Temp 98.9 °F (37.2 °C)   Resp 18   Ht 5' 10\" (1.778 m)   Wt 122.2 kg (269 lb 6.4 oz)   SpO2 92%   BMI 38.66 kg/m²      GENERAL:      He looks uncomfortable but not in distress  HEENT:           Normocephalic, atraumatic, non icteric sclerae, non pallor conjuctivae, EOMs intact, PERRLA. NECK: Supple, trachea midline, no adenopathy, no thyromegally or tenderness, no carotid bruit and no JVD. LUNGS:           Diminished air entry basally. Accessory muscle use. HEART:            S1 and S2 well heard,RRR,  no murmur, click, rub or gallop. ABDOMEB:     Obese, soft, non-tender. Normoactive bowel sounds. No masses,  No organomegaly. EXTREMETIES:          Atraumatic, acyanotic, no edema  PULSES:         2+ and symmetric all extremities.   SKIN:    No rashes or lesions  NEUROLOGY: Alert and oriented to PPT, CNII-XII intact. Motor and sensory exam grossly intact.           Data Review:    All diagnostic labs and studies have been reviewed.     Chest x-ray: non-specific bibasilar airspace disease        Signed By: Greer Salcido MD      December 10, 2021

## 2021-12-11 NOTE — PROGRESS NOTES
Problem: Gas Exchange - Impaired  Goal: Absence of hypoxia  Outcome: Progressing Towards Goal  Goal: Promote optimal lung function  Outcome: Progressing Towards Goal     Problem: Breathing Pattern - Ineffective  Goal: Ability to achieve and maintain a regular respiratory rate  Outcome: Progressing Towards Goal     Problem: Isolation Precautions - Risk of Spread of Infection  Goal: Prevent transmission of infectious organism to others  Outcome: Progressing Towards Goal     Problem: Loneliness or Risk for Loneliness  Goal: Demonstrate positive use of time alone when socialization is not possible  Outcome: Progressing Towards Goal     Problem: Patient Education: Go to Patient Education Activity  Goal: Patient/Family Education  Outcome: Progressing Towards Goal     Problem: Discharge Planning  Goal: *Discharge to safe environment  Outcome: Progressing Towards Goal

## 2021-12-12 LAB
ATRIAL RATE: 98 BPM
CALCULATED P AXIS, ECG09: 9 DEGREES
CALCULATED R AXIS, ECG10: 18 DEGREES
CALCULATED T AXIS, ECG11: 45 DEGREES
D DIMER PPP FEU-MCNC: 0.53 MG/L FEU (ref 0–0.65)
DIAGNOSIS, 93000: NORMAL
GLUCOSE BLD STRIP.AUTO-MCNC: 161 MG/DL (ref 65–117)
GLUCOSE BLD STRIP.AUTO-MCNC: 185 MG/DL (ref 65–117)
GLUCOSE BLD STRIP.AUTO-MCNC: 243 MG/DL (ref 65–117)
P-R INTERVAL, ECG05: 156 MS
Q-T INTERVAL, ECG07: 324 MS
QRS DURATION, ECG06: 84 MS
QTC CALCULATION (BEZET), ECG08: 413 MS
SERVICE CMNT-IMP: ABNORMAL
VENTRICULAR RATE, ECG03: 98 BPM

## 2021-12-12 PROCEDURE — 74011250637 HC RX REV CODE- 250/637: Performed by: HOSPITALIST

## 2021-12-12 PROCEDURE — 74011250636 HC RX REV CODE- 250/636: Performed by: FAMILY MEDICINE

## 2021-12-12 PROCEDURE — 74011250637 HC RX REV CODE- 250/637: Performed by: FAMILY MEDICINE

## 2021-12-12 PROCEDURE — 85379 FIBRIN DEGRADATION QUANT: CPT

## 2021-12-12 PROCEDURE — 65660000000 HC RM CCU STEPDOWN

## 2021-12-12 PROCEDURE — 74011250637 HC RX REV CODE- 250/637: Performed by: INTERNAL MEDICINE

## 2021-12-12 PROCEDURE — 94760 N-INVAS EAR/PLS OXIMETRY 1: CPT

## 2021-12-12 PROCEDURE — 36415 COLL VENOUS BLD VENIPUNCTURE: CPT

## 2021-12-12 PROCEDURE — 74011636637 HC RX REV CODE- 636/637: Performed by: HOSPITALIST

## 2021-12-12 PROCEDURE — 82962 GLUCOSE BLOOD TEST: CPT

## 2021-12-12 RX ADMIN — BENZONATATE 100 MG: 100 CAPSULE ORAL at 21:00

## 2021-12-12 RX ADMIN — GUAIFENESIN AND CODEINE PHOSPHATE 10 ML: 100; 10 SOLUTION ORAL at 12:43

## 2021-12-12 RX ADMIN — GUAIFENESIN AND CODEINE PHOSPHATE 10 ML: 100; 10 SOLUTION ORAL at 07:28

## 2021-12-12 RX ADMIN — ONDANSETRON 4 MG: 2 INJECTION INTRAMUSCULAR; INTRAVENOUS at 12:43

## 2021-12-12 RX ADMIN — BENZONATATE 100 MG: 100 CAPSULE ORAL at 16:25

## 2021-12-12 RX ADMIN — INSULIN LISPRO 2 UNITS: 100 INJECTION, SOLUTION INTRAVENOUS; SUBCUTANEOUS at 19:02

## 2021-12-12 RX ADMIN — OXYCODONE HYDROCHLORIDE AND ACETAMINOPHEN 1000 MG: 500 TABLET ORAL at 19:01

## 2021-12-12 RX ADMIN — Medication 10 ML: at 21:01

## 2021-12-12 RX ADMIN — DEXAMETHASONE 6 MG: 4 TABLET ORAL at 12:43

## 2021-12-12 RX ADMIN — ZINC SULFATE 220 MG (50 MG) CAPSULE 1 CAPSULE: CAPSULE at 10:20

## 2021-12-12 RX ADMIN — OXYCODONE 10 MG: 5 TABLET ORAL at 10:23

## 2021-12-12 RX ADMIN — OXYCODONE 10 MG: 5 TABLET ORAL at 01:48

## 2021-12-12 RX ADMIN — OXYCODONE 10 MG: 5 TABLET ORAL at 16:25

## 2021-12-12 RX ADMIN — OXYCODONE HYDROCHLORIDE AND ACETAMINOPHEN 1000 MG: 500 TABLET ORAL at 10:20

## 2021-12-12 RX ADMIN — ONDANSETRON 4 MG: 2 INJECTION INTRAMUSCULAR; INTRAVENOUS at 21:01

## 2021-12-12 RX ADMIN — OXYCODONE 10 MG: 5 TABLET ORAL at 21:00

## 2021-12-12 RX ADMIN — ENOXAPARIN SODIUM 40 MG: 100 INJECTION SUBCUTANEOUS at 10:21

## 2021-12-12 RX ADMIN — GUAIFENESIN AND CODEINE PHOSPHATE 10 ML: 100; 10 SOLUTION ORAL at 19:01

## 2021-12-12 RX ADMIN — BENZONATATE 100 MG: 100 CAPSULE ORAL at 10:20

## 2021-12-12 RX ADMIN — VENLAFAXINE HYDROCHLORIDE 300 MG: 150 CAPSULE, EXTENDED RELEASE ORAL at 07:28

## 2021-12-12 RX ADMIN — ENOXAPARIN SODIUM 40 MG: 100 INJECTION SUBCUTANEOUS at 21:01

## 2021-12-12 RX ADMIN — Medication 10 ML: at 07:28

## 2021-12-12 RX ADMIN — KETOROLAC TROMETHAMINE 30 MG: 30 INJECTION, SOLUTION INTRAMUSCULAR; INTRAVENOUS at 16:25

## 2021-12-12 RX ADMIN — BARICITINIB 4 MG: 2 TABLET, FILM COATED ORAL at 10:31

## 2021-12-12 RX ADMIN — LISINOPRIL 5 MG: 5 TABLET ORAL at 10:21

## 2021-12-12 NOTE — ROUTINE PROCESS
Bedside shift change report given to Lisette Greer (oncoming nurse) by Ernestina Vargas (offgoing nurse). Report included the following information SBAR, Kardex, Intake/Output, MAR and Recent Results.

## 2021-12-12 NOTE — PROGRESS NOTES
Problem: Falls - Risk of  Goal: *Absence of Falls  Description: Document Nadeem Olivares Fall Risk and appropriate interventions in the flowsheet.   Outcome: Progressing Towards Goal  Note: Fall Risk Interventions:            Medication Interventions: Evaluate medications/consider consulting pharmacy

## 2021-12-12 NOTE — PROGRESS NOTES
Bedside and Verbal shift change report given to Allison (oncoming nurse) by Dao Valenzuela (offgoing nurse). Report included the following information SBAR, Kardex, MAR and Recent Results.

## 2021-12-12 NOTE — PROGRESS NOTES
[address] [facility] Adult  Hospitalist Group  Progress note   Francisco Max MD        Primary Care Provider: Other, MD Jamal  Date of Service:  12/10/2021     From H&P      Kath Horta is a 39 y.o. male with a pmhx HTN,and TBI who presents with hypoxic respiratory failure 2/2 covid pneumonia  He is unvaccinated. His sx began 4 days ago and include myalgias, arthralgias, cough and fatigue.     In the ED, VS were significant for spo2 88% with improvement to 93%  On 3lpm.  Labs were significant for Na 130, elevated inflammatory markers, and normal d-dimer. Rapid covid was positive. CXR showed non-specific bibasilar airspace disease. EKG shows NSR.     In the ED, he received decaadron, tylenol, toradol, morphine, and IVF.        Subjectively    --He notes improvement of the shortness of breath and work of breathing, he actually appeared much calmer today. Oxygen 9 L/min     Assessment and plan    #Hypoxic Respiratory failure  #Covid pneumonia  Supplemental oxygen to keep SPO2 above 94%. Currently on 9 L/min  Continue Decadron. On baricitinib. Patient declined Remdesevir. Anticoagulation per Covid protocol. D-dimer very low  -CRP elevated, 9.09 on admission, he did not receive Actemra. Contact plus droplet precautions  Other supportive therapy: Antitussives. -Encouraged to increase mobility, out of bed to chair, seen by edge of bed for every meal, prone as able  -     #Hypertension  Continue home lisinopril     #Depression  Continue home Effexor     #Type II diabetes. A1c 7.8. Patient denied diagnosis prior to admission. -- Accu-Cheks along with Humalog correction scale  --He will need Metformin on discharge.     #Mild hyponatremia due to acute illness, poor oral intake. Encourage patient to increase oral intake, solid and liquids.   Would like to avoid IV fluids in the setting of Covid with hypoxia requiring oxygen.     Obese  Body mass index is 38.66 kg/m².     CODE STATUS: Full  VTE prophylaxis: Enoxaparin, dosed per COVID protocol  Disposition: Anticipate home, RUBEN> 48 hours     FUNCTIONAL STATUS PRIOR TO HOSPITALIZATION Ambulates Independently          Review of Systems:     All other review of systems were negative except for that written in the History of Present Illness.           Past Medical History:   Diagnosis Date    Hypertension      Kidney stone      TBI (traumatic brain injury) ([facility] [address]              Past Surgical History:   Procedure Laterality Date    HX LITHOTRIPSY                  Prior to Admission medications    Medication Sig Start Date End Date Taking? Authorizing Provider   venlafaxine HCl (EFFEXOR PO) Take  by mouth daily.     Yes Other, MD Jamal   LISINOPRIL PO Take  by mouth daily.     Yes Other, MD Jamal      No Known Allergies   History reviewed. No pertinent family history.      SOCIAL HISTORY:  Patient resides at Home. Patient ambulates independently  Smoking history: occasional  Alcohol history: occasional     Objective:         Physical Exam:   Visit Vitals  /86   Pulse 96   Temp 98.9 °F (37.2 °C)   Resp 18   Ht 5' 10\" (1.778 m)   Wt 122.2 kg (269 lb 6.4 oz)   SpO2 92%   BMI 38.66 kg/m²      GENERAL:      He looks uncomfortable but not in distress  HEENT:           Normocephalic, atraumatic, non icteric sclerae, non pallor conjuctivae, EOMs intact, PERRLA. NECK: Supple, trachea midline, no adenopathy, no thyromegally or tenderness, no carotid bruit and no JVD. LUNGS:           Diminished air entry basally. Accessory muscle use. HEART:            S1 and S2 well heard,RRR,  no murmur, click, rub or gallop. ABDOMEB:     Obese, soft, non-tender. Normoactive bowel sounds. No masses,  No organomegaly. EXTREMETIES:          Atraumatic, acyanotic, no edema  PULSES:         2+ and symmetric all extremities. SKIN:    No rashes or lesions  NEUROLOGY:            Alert and oriented to PPT, CNII-XII intact.  Motor and sensory exam grossly intact.           Data Review:    All diagnostic labs and studies have been reviewed.     Chest x-ray: non-specific bibasilar airspace disease        Signed By: Gera Sena MD      December 10, 2021

## 2021-12-13 ENCOUNTER — APPOINTMENT (OUTPATIENT)
Dept: GENERAL RADIOLOGY | Age: 41
DRG: 177 | End: 2021-12-13
Attending: HOSPITALIST
Payer: MEDICARE

## 2021-12-13 LAB
ALBUMIN SERPL-MCNC: 3.1 G/DL (ref 3.5–5)
ALBUMIN/GLOB SERPL: 0.7 {RATIO} (ref 1.1–2.2)
ALP SERPL-CCNC: 96 U/L (ref 45–117)
ALT SERPL-CCNC: 60 U/L (ref 12–78)
ANION GAP SERPL CALC-SCNC: 4 MMOL/L (ref 5–15)
AST SERPL-CCNC: 38 U/L (ref 15–37)
BILIRUB SERPL-MCNC: 0.4 MG/DL (ref 0.2–1)
BUN SERPL-MCNC: 22 MG/DL (ref 6–20)
BUN/CREAT SERPL: 24 (ref 12–20)
CALCIUM SERPL-MCNC: 8.9 MG/DL (ref 8.5–10.1)
CHLORIDE SERPL-SCNC: 97 MMOL/L (ref 97–108)
CO2 SERPL-SCNC: 30 MMOL/L (ref 21–32)
COMMENT, HOLDF: NORMAL
CREAT SERPL-MCNC: 0.92 MG/DL (ref 0.7–1.3)
CRP SERPL-MCNC: 8.63 MG/DL (ref 0–0.6)
GLOBULIN SER CALC-MCNC: 4.6 G/DL (ref 2–4)
GLUCOSE BLD STRIP.AUTO-MCNC: 147 MG/DL (ref 65–117)
GLUCOSE BLD STRIP.AUTO-MCNC: 161 MG/DL (ref 65–117)
GLUCOSE BLD STRIP.AUTO-MCNC: 215 MG/DL (ref 65–117)
GLUCOSE SERPL-MCNC: 200 MG/DL (ref 65–100)
POTASSIUM SERPL-SCNC: 4.5 MMOL/L (ref 3.5–5.1)
PROT SERPL-MCNC: 7.7 G/DL (ref 6.4–8.2)
SAMPLES BEING HELD,HOLD: NORMAL
SERVICE CMNT-IMP: ABNORMAL
SODIUM SERPL-SCNC: 131 MMOL/L (ref 136–145)

## 2021-12-13 PROCEDURE — 82962 GLUCOSE BLOOD TEST: CPT

## 2021-12-13 PROCEDURE — 36415 COLL VENOUS BLD VENIPUNCTURE: CPT

## 2021-12-13 PROCEDURE — 74011250636 HC RX REV CODE- 250/636: Performed by: FAMILY MEDICINE

## 2021-12-13 PROCEDURE — 74011250637 HC RX REV CODE- 250/637: Performed by: FAMILY MEDICINE

## 2021-12-13 PROCEDURE — 74011250637 HC RX REV CODE- 250/637: Performed by: INTERNAL MEDICINE

## 2021-12-13 PROCEDURE — 80053 COMPREHEN METABOLIC PANEL: CPT

## 2021-12-13 PROCEDURE — 65660000000 HC RM CCU STEPDOWN

## 2021-12-13 PROCEDURE — 77010033711 HC HIGH FLOW OXYGEN

## 2021-12-13 PROCEDURE — 99233 SBSQ HOSP IP/OBS HIGH 50: CPT | Performed by: CLINICAL NURSE SPECIALIST

## 2021-12-13 PROCEDURE — 86140 C-REACTIVE PROTEIN: CPT

## 2021-12-13 PROCEDURE — 71045 X-RAY EXAM CHEST 1 VIEW: CPT

## 2021-12-13 PROCEDURE — 74011636637 HC RX REV CODE- 636/637: Performed by: HOSPITALIST

## 2021-12-13 PROCEDURE — 74011250637 HC RX REV CODE- 250/637: Performed by: HOSPITALIST

## 2021-12-13 RX ORDER — DEXTROSE 50 % IN WATER (D50W) INTRAVENOUS SYRINGE
25-50 AS NEEDED
Status: DISCONTINUED | OUTPATIENT
Start: 2021-12-13 | End: 2021-12-16 | Stop reason: HOSPADM

## 2021-12-13 RX ORDER — INSULIN GLARGINE 100 [IU]/ML
0.2 INJECTION, SOLUTION SUBCUTANEOUS DAILY
Status: DISCONTINUED | OUTPATIENT
Start: 2021-12-13 | End: 2021-12-16 | Stop reason: HOSPADM

## 2021-12-13 RX ORDER — MAGNESIUM SULFATE 100 %
4 CRYSTALS MISCELLANEOUS AS NEEDED
Status: DISCONTINUED | OUTPATIENT
Start: 2021-12-13 | End: 2021-12-16 | Stop reason: HOSPADM

## 2021-12-13 RX ADMIN — GUAIFENESIN AND CODEINE PHOSPHATE 10 ML: 100; 10 SOLUTION ORAL at 08:33

## 2021-12-13 RX ADMIN — ENOXAPARIN SODIUM 40 MG: 100 INJECTION SUBCUTANEOUS at 09:40

## 2021-12-13 RX ADMIN — OXYCODONE HYDROCHLORIDE AND ACETAMINOPHEN 1000 MG: 500 TABLET ORAL at 08:22

## 2021-12-13 RX ADMIN — ENOXAPARIN SODIUM 40 MG: 100 INJECTION SUBCUTANEOUS at 21:26

## 2021-12-13 RX ADMIN — GUAIFENESIN AND CODEINE PHOSPHATE 10 ML: 100; 10 SOLUTION ORAL at 02:14

## 2021-12-13 RX ADMIN — GUAIFENESIN AND CODEINE PHOSPHATE 10 ML: 100; 10 SOLUTION ORAL at 21:26

## 2021-12-13 RX ADMIN — BENZONATATE 100 MG: 100 CAPSULE ORAL at 21:26

## 2021-12-13 RX ADMIN — Medication 10 ML: at 21:27

## 2021-12-13 RX ADMIN — OXYCODONE 10 MG: 5 TABLET ORAL at 06:48

## 2021-12-13 RX ADMIN — OXYCODONE 10 MG: 5 TABLET ORAL at 12:12

## 2021-12-13 RX ADMIN — KETOROLAC TROMETHAMINE 30 MG: 30 INJECTION, SOLUTION INTRAMUSCULAR; INTRAVENOUS at 08:33

## 2021-12-13 RX ADMIN — KETOROLAC TROMETHAMINE 30 MG: 30 INJECTION, SOLUTION INTRAMUSCULAR; INTRAVENOUS at 02:14

## 2021-12-13 RX ADMIN — BENZONATATE 100 MG: 100 CAPSULE ORAL at 16:46

## 2021-12-13 RX ADMIN — BARICITINIB 4 MG: 2 TABLET, FILM COATED ORAL at 09:40

## 2021-12-13 RX ADMIN — VENLAFAXINE HYDROCHLORIDE 300 MG: 150 CAPSULE, EXTENDED RELEASE ORAL at 08:22

## 2021-12-13 RX ADMIN — Medication 10 ML: at 06:48

## 2021-12-13 RX ADMIN — DEXAMETHASONE 6 MG: 4 TABLET ORAL at 12:12

## 2021-12-13 RX ADMIN — LISINOPRIL 5 MG: 5 TABLET ORAL at 08:22

## 2021-12-13 RX ADMIN — INSULIN LISPRO 2 UNITS: 100 INJECTION, SOLUTION INTRAVENOUS; SUBCUTANEOUS at 18:20

## 2021-12-13 RX ADMIN — GUAIFENESIN AND CODEINE PHOSPHATE 10 ML: 100; 10 SOLUTION ORAL at 16:46

## 2021-12-13 RX ADMIN — OXYCODONE HYDROCHLORIDE AND ACETAMINOPHEN 1000 MG: 500 TABLET ORAL at 16:46

## 2021-12-13 RX ADMIN — INSULIN GLARGINE 24 UNITS: 100 INJECTION, SOLUTION SUBCUTANEOUS at 09:39

## 2021-12-13 RX ADMIN — ONDANSETRON 4 MG: 4 TABLET, ORALLY DISINTEGRATING ORAL at 12:12

## 2021-12-13 RX ADMIN — BENZONATATE 100 MG: 100 CAPSULE ORAL at 08:22

## 2021-12-13 RX ADMIN — ZINC SULFATE 220 MG (50 MG) CAPSULE 1 CAPSULE: CAPSULE at 08:22

## 2021-12-13 RX ADMIN — ONDANSETRON 4 MG: 2 INJECTION INTRAMUSCULAR; INTRAVENOUS at 06:48

## 2021-12-13 RX ADMIN — OXYCODONE 10 MG: 5 TABLET ORAL at 18:21

## 2021-12-13 RX ADMIN — KETOROLAC TROMETHAMINE 30 MG: 30 INJECTION, SOLUTION INTRAMUSCULAR; INTRAVENOUS at 21:26

## 2021-12-13 RX ADMIN — OXYCODONE 10 MG: 5 TABLET ORAL at 21:26

## 2021-12-13 RX ADMIN — KETOROLAC TROMETHAMINE 30 MG: 30 INJECTION, SOLUTION INTRAMUSCULAR; INTRAVENOUS at 16:45

## 2021-12-13 NOTE — PROGRESS NOTES
Physician Progress Note      Heath Salcedo  CSN #:                  [id number]  :                       1980  ADMIT DATE:       2021 10:22 AM  DISCH DATE:  RESPONDING  PROVIDER #:        Zeb Russell MD          QUERY TEXT:      Dear attending,      Patient admitted with Covid pneumonia. Noted documentation of  respiratory failure with hypoxia. If possible, please document in progress notes and discharge summary if you are evaluating and /or treating any of the following: The medical record reflects the following:  Risk Factors: + Covid pna  Clinical Indicators: pox 90% on O2 3lnc on 12/10, pox 91% on O2 6lnc on , 90% pox on O2 10lnc  - per PN-Hypoxic Respiratory failure  Covid pneumonia  Supplemental oxygen to keep SPO2 above 94%.   Currently on 9 L/min  Treatment: IV Decadron 6 mg x 1 on , Decadron 6 mg po qd, Monitor vital signs, labwork, imaging, pulse oximetry, Oxygen to 10lnc    Acute Respiratory Failure Clinical Indicators per [employer] Training Guide and Quick Reference Guide:  pO2 < 60 mmHg or SpO2 (pulse oximetry) < 91% breathing room air  pCO2 > 50 and pH < 7.35  P/F ratio (pO2 / FIO2) < 300  pO2 decrease or pCO2 increase by 10 mmHg from baseline (if known)  Supplemental oxygen of 40% or more  Presence of respiratory distress, tachypnea, dyspnea, shortness of breath, wheezing  Unable to speak in complete sentences  Use of accessory muscles to breathe  Extreme anxiety and feeling of impending doom  Tripod position  Confusion/altered mental status/obtunded      Thank you,  Fe Beverly RN, BSN  Clinical documentation Improvement  [phone]  Options provided:  -- acute respiratory failure  -- Not acute respiratory failure  -- Other - I will add my own diagnosis  -- Disagree - Not applicable / Not valid  -- Disagree - Clinically unable to determine / Unknown  -- Refer to Clinical Documentation Reviewer    PROVIDER RESPONSE TEXT:    This patient has acute respiratory failure    Query created by: Maral Orosco on 12/13/2021 12:41 PM      Electronically signed by:  Amanda Diaz MD 12/13/2021 3:54 PM

## 2021-12-13 NOTE — PROGRESS NOTES
Bedside shift change report given to Robson Hook RN (oncoming nurse) by Siri Dallas RN (offgoing nurse). Report included the following information SBAR, Kardex, Intake/Output, MAR, Recent Results, Med Rec Status and Cardiac Rhythm Sinus Rhythm.

## 2021-12-13 NOTE — PROGRESS NOTES
[address] [facility] Adult  Hospitalist Group  Progress note   Simone Wong MD        Primary Care Provider: Other, MD Jamal  Date of Service:  12/10/2021     From H&P      Mart Arrieta is a 39 y.o. male with a pmhx HTN,and TBI who presents with hypoxic respiratory failure 2/2 covid pneumonia  He is unvaccinated. His sx began 4 days ago and include myalgias, arthralgias, cough and fatigue.     In the ED, VS were significant for spo2 88% with improvement to 93%  On 3lpm.  Labs were significant for Na 130, elevated inflammatory markers, and normal d-dimer. Rapid covid was positive. CXR showed non-specific bibasilar airspace disease. EKG shows NSR.     In the ED, he received decaadron, tylenol, toradol, morphine, and IVF.        Subjectively    --Shortness of breath improving but still with significant dyspnea with minimal exertion. On oxygen at 9 L/min, decreased to 6 L/min and SPO2 trend above 93%. Discussed with RN about this changes and to continue to monitor.     Assessment and plan    #H acute hypoxic respiratory failure  #Covid pneumonia  Supplemental oxygen to keep SPO2 above 94%. Currently on 6 L/min  Continue Decadron. On baricitinib. Patient declined Remdesevir. Anticoagulation per Covid protocol. D-dimer very low  -CRP elevated, 9.09 on admission, he did not receive Actemra. Contact plus droplet precautions  Other supportive therapy: Antitussives. -Encouraged to increase mobility, out of bed to chair, seen by edge of bed for every meal, prone as able  -     #Hypertension  Continue home lisinopril     #Depression  Continue home Effexor     #Type II diabetes. A1c 7.8. This is a new diagnosis. Patient consult. -- Accu-Cheks along with Humalog correction scale  --He will need Metformin on discharge.     #Mild hyponatremia due to acute illness, poor oral intake. Encourage patient to increase oral intake, solid and liquids.   Would like to avoid IV fluids in the setting of Covid with hypoxia requiring oxygen.     Obese  Body mass index is 38.66 kg/m².     CODE STATUS: Full  VTE prophylaxis: Enoxaparin, dosed per COVID protocol  Disposition: Anticipate home, RUBEN> 48 hours     FUNCTIONAL STATUS PRIOR TO HOSPITALIZATION Ambulates Independently          Review of Systems:     All other review of systems were negative except for that written in the History of Present Illness.           Past Medical History:   Diagnosis Date    Hypertension      Kidney stone      [facility] (traumatic [facility]) ([facility] [address]              Past Surgical History:   Procedure Laterality Date    HX LITHOTRIPSY                  Prior to Admission medications    Medication Sig Start Date End Date Taking? Authorizing Provider   venlafaxine HCl (EFFEXOR PO) Take  by mouth daily.     Yes Other, MD Jamal   LISINOPRIL PO Take  by mouth daily.     Yes Other, MD Jamal      No Known Allergies   History reviewed. No pertinent family history.      SOCIAL HISTORY:  Patient resides at Home. Patient ambulates independently  Smoking history: occasional  Alcohol history: occasional     Objective:         Physical Exam:   Visit Vitals  /86   Pulse 96   Temp 98.9 °F (37.2 °C)   Resp 18   Ht 5' 10\" (1.778 m)   Wt 122.2 kg (269 lb 6.4 oz)   SpO2 92%   BMI 38.66 kg/m²      GENERAL:      He looks uncomfortable but not in distress  HEENT:           Normocephalic, atraumatic, non icteric sclerae, non pallor conjuctivae, EOMs intact, PERRLA. NECK: Supple, trachea midline, no adenopathy, no thyromegally or tenderness, no carotid bruit and no JVD. LUNGS:           Diminished air entry basally. Accessory muscle use. HEART:            S1 and S2 well heard,RRR,  no murmur, click, rub or gallop. ABDOMEB:     Obese, soft, non-tender. Normoactive bowel sounds. No masses,  No organomegaly. EXTREMETIES:          Atraumatic, acyanotic, no edema  PULSES:         2+ and symmetric all extremities.   SKIN:    No rashes or lesions  NEUROLOGY: Alert and oriented to PPT, CNII-XII intact. Motor and sensory exam grossly intact.           Data Review:    All diagnostic labs and studies have been reviewed.     Chest x-ray: non-specific bibasilar airspace disease        Signed By: Teresa Jordan MD      December 10, 2021

## 2021-12-13 NOTE — DIABETES MGMT
[address] [facility]    CLINICAL NURSE SPECIALIST CONSULT     Initial Presentation   Jeri Hein is a 39 y.o. male admitted 12/9/21 with hypoxic respiratory failure 2/2 COVID -19 pneumonia- Unvaccinated- labs with elevated d dimer/ elevated inflammatory markers. CXR consistent with COVID-19 pneumonia. HX:   Past Medical History:   Diagnosis Date    Hypertension     Kidney stone     TBI (traumatic brain injury) ([facility] [address]         INITIAL DX:   SOB (shortness of breath) [R06.02]     Current Treatment     TX: steroids/ 02 support/     Consulted by Provider for advanced diabetes nursing assessment and care for:   [x] Inpatient management strategy  [x] Survival skill education    Hospital Course   Clinical progress has been complicated by OCBBK-28 pneumonia w/ hypoxic respiratory failure. Diabetes History   New DM2 diagnosis- a1c 7.8 %- [facility] in June 2021- A1C >8%    Diabetes-related Medical History  Acute complications  Hyperglycemia-new DM2   Neurological complications  NONE  Microvascular disease  NONE  Macrovascular disease  NONE  Other associated conditions     HTN/ TBI    Diabetes Medication History  Key Antihyperglycemic Medications     Patient is on no antihyperglycemic meds. ** [facility]**    Diabetes self-management practices: deferred today-unable to reach by phone  Eating pattern-     Physical activity pattern-      Subjective   Unable to interview in person due to COVID-19 isolation precautions- Not able to reach by phone. Will try again tomorrow.      Objective   Physical exam  General Obese/male per BMI    Vital Signs   Visit Vitals  /66 (BP 1 Location: Left upper arm, BP Patient Position: At rest)   Pulse 69   Temp 98.3 °F (36.8 °C)   Resp 17   Ht 5' 10\" (1.778 m)   Wt 122.2 kg (269 lb 6.4 oz)   SpO2 93%   BMI 38.66 kg/m²       Laboratory  Recent Labs     12/13/21  0224 12/11/21  0150   * 186*   AGAP 4* 5 CREA 0.92 0.98   GFRNA >60 >60   AST 38*  --    ALT 60  --        Factors impacting BG management  Factor Dose Comments   Nutrition:  Standard meals     60 grams/meal      Drugs:  Steroids   Decadron 6mg daily     Impairs insulin action     Pain     Infection COVID-19 pneumonia    Other:   Kidney function  Liver function     WNl  WNL      Blood glucose pattern      Significant diabetes-related events over the past 24-72 hours  Admitting Bg 200  On Decadron 6mg daily  Started on Lantus 24 units daily for steroid induced hyperglycemia- Fasting / pre prandial: 161. Correctional insulin : minimal requirements. Assessment and Plan   Nursing Diagnosis Risk for unstable blood glucose pattern   Nursing Intervention Domain 5250 Decision-making Support   Nursing Interventions Examined current inpatient diabetes/blood glucose control   Explored factors facilitating and impeding inpatient management  Explored corrective strategies with patient and responsible inpatient provider   Informed patient of rational for insulin strategy while hospitalized     Nursing Diagnosis 89653 Ineffective Health Management   Nursing Intervention Domain 5255 Decision-makingSupport   Nursing Interventions Identified diabetes self-management practices impeding diabetes control  Discussed diabetes survival skills related to  1. Healthy Plate eating plan; given handouts  2. Role of physical activity in improving insulin sensitivity and action  3. Procedure for blood glucose monitoring & options for ow-cost products available from [facility]   4. Medications plan at discharge     Evaluation   This male  with NEW ONSET Type 2 diabetes, did not achieve diabetes control prior to admission, as evidenced by A1c of 7.8%. Admitted with COVID-19 pneumonia - On daily Decadron . Started on daily Lantus at 0.2u/kg dosing- with improvement in BG trends. [facility] and had [facility] for Metformin in June 2021.   Unable to converse with him re: compliance with Metformin. On discharge,   This patient would benefit from diabetes self-management education and support [facility]) after discharge. During this hospitalization, the patient has not achieved inpatient blood glucose target of 100-180mg/dl. Several factors have played a role in blood glucose management including:  [x] Critical nature of illness state-COVID-19 pneumonia  [x] Compromised insulin absorption or delivery  [x] Glucocorticoid use- Decadron daily      The Subcutaneous Insulin Order set (3038) has been in use. Hence, the next step in optimizing blood glucose control would be    [x] Implement the Subcutaneous Insulin order set  [x]  Optimize basal insulin dosing   [x]  Add mealtime insulin  (if appropriate)    Blood glucose management would be improved by  [x]  Adding insulin to override impact of steroids      Recommendations     [] Use of Subcutaneous Insulin Order set (1935)  Insulin Dosing Specific recommendation   CONTINUE Basal                                      (Based on weight, BMI & GFR) 24 units Lantus daily (0.2u/kg dosing)    CONTINUE Corrective                                       (Useful in adjusting insulin dosing) [x] Normal sensitivity        Discharge planning   1. Metformin at discharge- if not taken before, start Metformin ER 500mg BID. 2. Prescription for glucometer kit (Meter, Lancets (100), Strips (100)). Patient to obtain a blood glucose reading four times daily. First thing in the morning prior to eating and drinking anything then before lunch, dinner and bedtime. Create a log and present to PCP for interpretation    3. On Discharge, please place an outpatient order for \"diabetes education\" (enter as REF20). This will trigger a referral for the Program for Diabetes Health which includes outpatient diabetes self management training with a certified diabetes educator.     4. Will educate on diet/ BG monitoring/ importance of taking daily Metformin      Billing Code(s)   [x] [id number] IP subsequent hospital care - 35 minutes    Before making these care recommendations, I personally reviewed the hospitalization record, including notes, laboratory & diagnostic data and current medications, and examined the patient at the bedside (circumstances permitting) before making care recommendations.      Total minutes: [address] [place]z  Diabetes Clinical Nurse Specialist  [facility] Diabetes [facility]  Access via [address] [place] [address]

## 2021-12-14 LAB
GLUCOSE BLD STRIP.AUTO-MCNC: 114 MG/DL (ref 65–117)
GLUCOSE BLD STRIP.AUTO-MCNC: 153 MG/DL (ref 65–117)
GLUCOSE BLD STRIP.AUTO-MCNC: 186 MG/DL (ref 65–117)
SERVICE CMNT-IMP: ABNORMAL
SERVICE CMNT-IMP: ABNORMAL
SERVICE CMNT-IMP: NORMAL

## 2021-12-14 PROCEDURE — 94760 N-INVAS EAR/PLS OXIMETRY 1: CPT

## 2021-12-14 PROCEDURE — 74011250637 HC RX REV CODE- 250/637: Performed by: INTERNAL MEDICINE

## 2021-12-14 PROCEDURE — 99231 SBSQ HOSP IP/OBS SF/LOW 25: CPT | Performed by: CLINICAL NURSE SPECIALIST

## 2021-12-14 PROCEDURE — 74011250636 HC RX REV CODE- 250/636: Performed by: FAMILY MEDICINE

## 2021-12-14 PROCEDURE — 77010033711 HC HIGH FLOW OXYGEN

## 2021-12-14 PROCEDURE — 65660000000 HC RM CCU STEPDOWN

## 2021-12-14 PROCEDURE — 74011250637 HC RX REV CODE- 250/637: Performed by: HOSPITALIST

## 2021-12-14 PROCEDURE — 74011636637 HC RX REV CODE- 636/637: Performed by: HOSPITALIST

## 2021-12-14 PROCEDURE — 74011250637 HC RX REV CODE- 250/637: Performed by: FAMILY MEDICINE

## 2021-12-14 PROCEDURE — 82962 GLUCOSE BLOOD TEST: CPT

## 2021-12-14 RX ADMIN — ONDANSETRON 4 MG: 2 INJECTION INTRAMUSCULAR; INTRAVENOUS at 09:57

## 2021-12-14 RX ADMIN — GUAIFENESIN AND CODEINE PHOSPHATE 10 ML: 100; 10 SOLUTION ORAL at 23:54

## 2021-12-14 RX ADMIN — GUAIFENESIN AND CODEINE PHOSPHATE 10 ML: 100; 10 SOLUTION ORAL at 18:00

## 2021-12-14 RX ADMIN — GUAIFENESIN AND CODEINE PHOSPHATE 10 ML: 100; 10 SOLUTION ORAL at 09:51

## 2021-12-14 RX ADMIN — Medication 6 MG: at 23:54

## 2021-12-14 RX ADMIN — OXYCODONE HYDROCHLORIDE AND ACETAMINOPHEN 1000 MG: 500 TABLET ORAL at 09:51

## 2021-12-14 RX ADMIN — ENOXAPARIN SODIUM 40 MG: 100 INJECTION SUBCUTANEOUS at 09:51

## 2021-12-14 RX ADMIN — KETOROLAC TROMETHAMINE 30 MG: 30 INJECTION, SOLUTION INTRAMUSCULAR; INTRAVENOUS at 03:58

## 2021-12-14 RX ADMIN — BENZONATATE 100 MG: 100 CAPSULE ORAL at 09:49

## 2021-12-14 RX ADMIN — ONDANSETRON 4 MG: 2 INJECTION INTRAMUSCULAR; INTRAVENOUS at 18:00

## 2021-12-14 RX ADMIN — INSULIN GLARGINE 24 UNITS: 100 INJECTION, SOLUTION SUBCUTANEOUS at 09:51

## 2021-12-14 RX ADMIN — Medication 10 ML: at 22:49

## 2021-12-14 RX ADMIN — OXYCODONE 10 MG: 5 TABLET ORAL at 18:00

## 2021-12-14 RX ADMIN — GUAIFENESIN AND CODEINE PHOSPHATE 10 ML: 100; 10 SOLUTION ORAL at 03:57

## 2021-12-14 RX ADMIN — ENOXAPARIN SODIUM 40 MG: 100 INJECTION SUBCUTANEOUS at 22:48

## 2021-12-14 RX ADMIN — OXYCODONE 10 MG: 5 TABLET ORAL at 03:58

## 2021-12-14 RX ADMIN — KETOROLAC TROMETHAMINE 30 MG: 30 INJECTION, SOLUTION INTRAMUSCULAR; INTRAVENOUS at 09:51

## 2021-12-14 RX ADMIN — BENZONATATE 100 MG: 100 CAPSULE ORAL at 18:00

## 2021-12-14 RX ADMIN — OXYCODONE 10 MG: 5 TABLET ORAL at 09:51

## 2021-12-14 RX ADMIN — DEXAMETHASONE 6 MG: 4 TABLET ORAL at 12:02

## 2021-12-14 RX ADMIN — ZINC SULFATE 220 MG (50 MG) CAPSULE 1 CAPSULE: CAPSULE at 09:51

## 2021-12-14 RX ADMIN — OXYCODONE 10 MG: 5 TABLET ORAL at 23:54

## 2021-12-14 RX ADMIN — KETOROLAC TROMETHAMINE 30 MG: 30 INJECTION, SOLUTION INTRAMUSCULAR; INTRAVENOUS at 18:00

## 2021-12-14 RX ADMIN — BENZONATATE 100 MG: 100 CAPSULE ORAL at 22:48

## 2021-12-14 RX ADMIN — OXYCODONE HYDROCHLORIDE AND ACETAMINOPHEN 1000 MG: 500 TABLET ORAL at 18:00

## 2021-12-14 RX ADMIN — Medication 10 ML: at 06:00

## 2021-12-14 RX ADMIN — BARICITINIB 4 MG: 2 TABLET, FILM COATED ORAL at 09:52

## 2021-12-14 RX ADMIN — VENLAFAXINE HYDROCHLORIDE 300 MG: 150 CAPSULE, EXTENDED RELEASE ORAL at 09:50

## 2021-12-14 RX ADMIN — LISINOPRIL 5 MG: 5 TABLET ORAL at 09:51

## 2021-12-14 NOTE — DIABETES MGMT
[facility] [facility]    CLINICAL NURSE SPECIALIST CONSULT     Initial Presentation   Monalisa Arredondo is a 39 y.o. male admitted 12/9/21 with hypoxic respiratory failure 2/2 COVID -19 pneumonia- Unvaccinated- labs with elevated d dimer/ elevated inflammatory markers. CXR consistent with COVID-19 pneumonia. HX:   Past Medical History:   Diagnosis Date    Hypertension     Kidney stone     TBI (traumatic brain injury) ([facility] [address]         INITIAL DX:   SOB (shortness of breath) [R06.02]     Current Treatment     TX: steroids/ 02 support/     Consulted by Provider for advanced diabetes nursing assessment and care for:   [x] Inpatient management strategy  [x] Survival skill education    Hospital Course   Clinical progress has been complicated by EXTDW-32 pneumonia w/ hypoxic respiratory failure. Diabetes History   New DM2 diagnosis- a1c 7.8 %- [facility] - [facility] in June 2021- A1C >8%    Diabetes-related Medical History  Acute complications  Hyperglycemia-new DM2   Neurological complications  NONE  Microvascular disease  NONE  Macrovascular disease  NONE  Other associated conditions     HTN/ TBI    Diabetes Medication History  Key Antihyperglycemic Medications     Patient is on no antihyperglycemic meds. ** [facility] by [facility]**    Diabetes self-management practices: deferred today-unable to reach by phone  Eating pattern-     Physical activity pattern-      Subjective   Unable to interview in person due to COVID-19 isolation precautions- Not able to reach by phone.       Objective   Physical exam  General Obese/male per BMI    Vital Signs   Visit Vitals  /73 (BP 1 Location: Left upper arm, BP Patient Position: At rest)   Pulse 75   Temp 98 °F (36.7 °C)   Resp 20   Ht 5' 10\" (1.778 m)   Wt 118 kg (260 lb 2.3 oz)   SpO2 92%   BMI 37.33 kg/m²       Laboratory  Recent Labs     12/13/21  0224   *   AGAP 4*   CREA 0.92   GFRNA >60   AST 38*   ALT 60 Factors impacting BG management  Factor Dose Comments   Nutrition:  Standard meals     60 grams/meal      Drugs:  Steroids   Decadron 6mg daily     Impairs insulin action     Pain     Infection COVID-19 pneumonia    Other:   Kidney function  Liver function     WNl  WNL      Blood glucose pattern      Significant diabetes-related events over the past 24-72 hours  Admitting Bg 200  On Decadron 6mg daily  12/14:   Lantus 24 units daily for steroid induced hyperglycemia-. Correctional insulin : minimal requirements. Assessment and Plan   Nursing Diagnosis Risk for unstable blood glucose pattern   Nursing Intervention Domain 5250 Decision-making Support   Nursing Interventions Examined current inpatient diabetes/blood glucose control   Explored factors facilitating and impeding inpatient management  Explored corrective strategies with patient and responsible inpatient provider   Informed patient of rational for insulin strategy while hospitalized     Nursing Diagnosis 69927 Ineffective Health Management   Nursing Intervention Domain 5250 Decision-makingSupport   Nursing Interventions Identified diabetes self-management practices impeding diabetes control  Discussed diabetes survival skills related to  1. Healthy Plate eating plan; given handouts  2. Role of physical activity in improving insulin sensitivity and action  3. Procedure for blood glucose monitoring & options for ow-cost products available from [facility]   4. Medications plan at discharge     Evaluation   This male  with NEW ONSET Type 2 diabetes, did not achieve diabetes control prior to admission, as evidenced by A1c of 7.8%. Admitted with COVID-19 pneumonia - On daily Decadron . Started on daily Lantus at 0.2u/kg dosing- with improvement in BG trends. [facility] and had [facility] for Metformin in June 2021. Unable to converse with him re: compliance with Metformin.   On discharge, This patient would benefit from diabetes self-management education and support [facility] after [facility]. During this hospitalization, the patient has not achieved inpatient blood glucose target of 100-180mg/dl. Several factors have played a role in blood glucose management including:  [x] Critical nature of illness state-COVID-19 pneumonia  [x] Compromised insulin absorption or delivery  [x] Glucocorticoid use- Decadron daily      The Subcutaneous Insulin Order set (3723) has been in use. Hence, the next step in optimizing blood glucose control would be    [x] Implement the Subcutaneous Insulin order set  [x]  Optimize basal insulin dosing   [x]  Add mealtime insulin  (if appropriate)    Blood glucose management would be improved by  [x]  Adding insulin to override impact of steroids      Recommendations     [] Use of Subcutaneous Insulin Order set (9515)  Insulin Dosing Specific recommendation   CONTINUE Basal                                      (Based on weight, BMI & GFR) 24 units Lantus daily (0.2u/kg dosing)    CONTINUE Corrective                                       (Useful in adjusting insulin dosing) [x] Normal sensitivity        Discharge planning   1. Metformin at discharge- if not taken before, start Metformin ER 500mg BID. 2. Prescription for glucometer kit (Meter, Lancets (100), Strips (100)). Patient to obtain a blood glucose reading four times daily. First thing in the morning prior to eating and drinking anything then before lunch, dinner and bedtime. Create a log and present to PCP for interpretation    3. On Discharge, please place an outpatient order for \"diabetes education\" (enter as REF20). This will trigger a referral for the Program for Diabetes Health which includes outpatient diabetes self management training with a certified diabetes educator.     4. Will educate on diet/ BG monitoring/ importance of taking daily Metformin-Also placed diabetes education instructions in AVS -      Billing Code(s)   [Y]30652    Before making these care recommendations, I personally reviewed the hospitalization record, including notes, laboratory & diagnostic data and current medications, and examined the patient at the bedside (circumstances permitting) before making care recommendations.      Total minutes: [address] [place] CNS  Diabetes Clinical Nurse Specialist  [facility]  Access via [address] [place] [address]

## 2021-12-14 NOTE — PROGRESS NOTES
Bedside and Verbal shift change report given to [employer] (oncoming nurse) by Anthony Britton RN (offgoing nurse). Report included the following information SBAR, Kardex, ED Summary, Intake/Output, MAR, Recent Results and Cardiac Rhythm NSR.

## 2021-12-14 NOTE — PROGRESS NOTES
Bedside shift change report given to [address]  (oncoming nurse) by Tawanna Ponce  (offgoing nurse). Report included the following information SBAR, Kardex, MAR and Recent Results.

## 2021-12-14 NOTE — PROGRESS NOTES
Problem: Gas Exchange - Impaired  Goal: Absence of hypoxia  Outcome: Progressing Towards Goal  Goal: Promote optimal lung function  Outcome: Progressing Towards Goal     Problem: Breathing Pattern - Ineffective  Goal: Ability to achieve and maintain a regular respiratory rate  Outcome: Progressing Towards Goal     Problem: Falls - Risk of  Goal: *Absence of Falls  Description: Document Marky Reinoso Fall Risk and appropriate interventions in the flowsheet.   Outcome: Progressing Towards Goal  Note: Fall Risk Interventions:            Medication Interventions: Evaluate medications/consider consulting pharmacy, Patient to call before getting OOB, Teach patient to arise slowly

## 2021-12-14 NOTE — PROGRESS NOTES
[address] [facility] Adult  Hospitalist Group  Progress note   Elle Ann MD        Primary Care Provider: Other, MD Jamal  Date of Service:  12/10/2021     From &P      [facility] is a 39 y.o. male with a pmhx HTN,and TBI who presents with hypoxic respiratory failure 2/2 covid pneumonia  He is unvaccinated. His sx began 4 days ago and include myalgias, arthralgias, cough and fatigue.     In the ED, VS were significant for spo2 88% with improvement to 93%  On 3lpm.  Labs were significant for Na 130, elevated inflammatory markers, and normal d-dimer. Rapid covid was positive. CXR showed non-specific bibasilar airspace disease. EKG shows NSR.     In the ED, he received decaadron, tylenol, toradol, morphine, and IVF.        Subjectively    --Patient's breathing is getting better. He is saturating upper 90s on 7 L/min, cut down his oxygen  to 4 and his SPO2 remained above 94% so I left him on 4 and let the nurse know to monitor on current oxygen. --Now tells me he is on Metformin but does not member the dose.     Assessment and plan    #H acute hypoxic respiratory failure  #Covid pneumonia  Supplemental oxygen to keep SPO2 above 94%. Currently on 4 L/min  Continue Decadron. On baricitinib. Patient declined Remdesevir. Anticoagulation per Covid protocol. D-dimer very low  --CRP elevated, 9.09 on admission, he did not receive Actemra. Contact plus droplet precautions  Other supportive therapy: Antitussives. --Encouraged to increase mobility, out of bed to chair, seen by edge of bed for every meal, prone as able       #Hypertension  Continue home lisinopril     #Depression  Continue home Effexor     #Type II diabetes. A1c 7.8. Patient initially said he is not diabetic. He now told me he is on Metformin at home. -- Accu-Cheks along with Humalog correction scale  --He will need Metformin on discharge.     #Mild hyponatremia due to acute illness, poor oral intake.   Encourage patient to increase oral intake, solid and liquids. Would like to avoid IV fluids in the setting of Covid with hypoxia requiring oxygen.     Obese  Body mass index is 38.66 kg/m².     CODE STATUS: Full  VTE prophylaxis: Enoxaparin, dosed per COVID protocol  Disposition: Anticipate home, RUBEN> 48 hours     FUNCTIONAL STATUS PRIOR TO HOSPITALIZATION Ambulates Independently          Review of Systems:     All other review of systems were negative except for that written in the History of Present Illness.           Past Medical History:   Diagnosis Date    Hypertension      Kidney stone      TBI (traumatic brain injury) ([facility] [address]              Past Surgical History:   Procedure Laterality Date    HX LITHOTRIPSY                  Prior to Admission medications    Medication Sig Start Date End Date Taking? Authorizing Provider   venlafaxine HCl (EFFEXOR PO) Take  by mouth daily.     Yes Other, MD Jamal   LISINOPRIL PO Take  by mouth daily.     Yes Other, MD Jamal      No Known Allergies   History reviewed. No pertinent family history.      SOCIAL HISTORY:  Patient resides at Home. Patient ambulates independently  Smoking history: occasional  Alcohol history: occasional     Objective:         Physical Exam:   Visit Vitals  /86   Pulse 96   Temp 98.9 °F (37.2 °C)   Resp 18   Ht 5' 10\" (1.778 m)   Wt 122.2 kg (269 lb 6.4 oz)   SpO2 92%   BMI 38.66 kg/m²      GENERAL:      He looks uncomfortable but not in distress  HEENT:           Normocephalic, atraumatic, non icteric sclerae, non pallor conjuctivae, EOMs intact, PERRLA. NECK: Supple, trachea midline, no adenopathy, no thyromegally or tenderness, no carotid bruit and no JVD. LUNGS:           Diminished air entry basally. Accessory muscle use. HEART:            S1 and S2 well heard,RRR,  no murmur, click, rub or gallop. ABDOMEB:     Obese, soft, non-tender. Normoactive bowel sounds. No masses,  No organomegaly.   EXTREMETIES:          Atraumatic, acyanotic, no edema  PULSES:         2+ and symmetric all extremities. SKIN:    No rashes or lesions  NEUROLOGY:            Alert and oriented to PPT, CNII-XII intact. Motor and sensory exam grossly intact.           Data Review:    All diagnostic labs and studies have been reviewed.     Chest x-ray: non-specific bibasilar airspace disease        Signed By: Kat Romero MD      December 10, 2021

## 2021-12-14 NOTE — PROGRESS NOTES
Patient resting in bed, alert and oriented, on 7 liters of midflow. Patient with complaints of generalized patient throughout body. Appetite is fair, patient states the cannot taste food. Problem: Airway Clearance - Ineffective  Goal: Achieve or maintain patent airway  Outcome: Progressing Towards Goal     Problem: Gas Exchange - Impaired  Goal: Absence of hypoxia  Outcome: Progressing Towards Goal  Goal: Promote optimal lung function  Outcome: Progressing Towards Goal     Problem: Breathing Pattern - Ineffective  Goal: Ability to achieve and maintain a regular respiratory rate  Outcome: Progressing Towards Goal     Problem:  Body Temperature -  Risk of, Imbalanced  Goal: Ability to maintain a body temperature within defined limits  Outcome: Progressing Towards Goal  Goal: Will regain or maintain usual level of consciousness  Outcome: Progressing Towards Goal  Goal: Complications related to the disease process, condition or treatment will be avoided or minimized  Outcome: Progressing Towards Goal     Problem: Isolation Precautions - Risk of Spread of Infection  Goal: Prevent transmission of infectious organism to others  Outcome: Progressing Towards Goal     Problem: Nutrition Deficits  Goal: Optimize nutrtional status  Outcome: Progressing Towards Goal     Problem: Risk for Fluid Volume Deficit  Goal: Maintain normal heart rhythm  Outcome: Progressing Towards Goal  Goal: Maintain absence of muscle cramping  Outcome: Progressing Towards Goal  Goal: Maintain normal serum potassium, sodium, calcium, phosphorus, and pH  Outcome: Progressing Towards Goal     Problem: Loneliness or Risk for Loneliness  Goal: Demonstrate positive use of time alone when socialization is not possible  Outcome: Progressing Towards Goal     Problem: Fatigue  Goal: Verbalize increase energy and improved vitality  Outcome: Progressing Towards Goal     Problem: Patient Education: Go to Patient Education Activity  Goal: Patient/Family Education  Outcome: Progressing Towards Goal     Problem: Discharge Planning  Goal: *Discharge to safe environment  Outcome: Progressing Towards Goal     Problem: Falls - Risk of  Goal: *Absence of Falls  Description: Document Anna Fall Risk and appropriate interventions in the flowsheet. Outcome: Progressing Towards Goal  Note: Fall Risk Interventions:            Medication Interventions: Evaluate medications/consider consulting pharmacy                   Problem: Patient Education: Go to Patient Education Activity  Goal: Patient/Family Education  Outcome: Progressing Towards Goal     Problem: Diabetes Self-Management  Goal: *Disease process and treatment process  Description: Define diabetes and identify own type of diabetes; list 3 options for treating diabetes. Outcome: Progressing Towards Goal  Goal: *Incorporating nutritional management into lifestyle  Description: Describe effect of type, amount and timing of food on blood glucose; list 3 methods for planning meals. Outcome: Progressing Towards Goal  Goal: *Incorporating physical activity into lifestyle  Description: State effect of exercise on blood glucose levels. Outcome: Progressing Towards Goal  Goal: *Developing strategies to promote health/change behavior  Description: Define the ABC's of diabetes; identify appropriate screenings, schedule and personal plan for screenings. Outcome: Progressing Towards Goal  Goal: *Using medications safely  Description: State effect of diabetes medications on diabetes; name diabetes medication taking, action and side effects. Outcome: Progressing Towards Goal  Goal: *Monitoring blood glucose, interpreting and using results  Description: Identify recommended blood glucose targets  and personal targets.   Outcome: Progressing Towards Goal  Goal: *Prevention, detection, treatment of acute complications  Description: List symptoms of hyper- and hypoglycemia; describe how to treat low blood sugar and actions for lowering  high blood glucose level. Outcome: Progressing Towards Goal  Goal: *Prevention, detection and treatment of chronic complications  Description: Define the natural course of diabetes and describe the relationship of blood glucose levels to long term complications of diabetes.   Outcome: Progressing Towards Goal  Goal: *Developing strategies to address psychosocial issues  Description: Describe feelings about living with diabetes; identify support needed and support network  Outcome: Progressing Towards Goal  Goal: *Insulin pump training  Outcome: Progressing Towards Goal  Goal: *Sick day guidelines  Outcome: Progressing Towards Goal  Goal: *Patient Specific Goal (EDIT GOAL, INSERT TEXT)  Outcome: Progressing Towards Goal     Problem: Patient Education: Go to Patient Education Activity  Goal: Patient/Family Education  Outcome: Progressing Towards Goal

## 2021-12-15 LAB
ANION GAP SERPL CALC-SCNC: 4 MMOL/L (ref 5–15)
BUN SERPL-MCNC: 19 MG/DL (ref 6–20)
BUN/CREAT SERPL: 24 (ref 12–20)
CALCIUM SERPL-MCNC: 9.1 MG/DL (ref 8.5–10.1)
CHLORIDE SERPL-SCNC: 100 MMOL/L (ref 97–108)
CO2 SERPL-SCNC: 29 MMOL/L (ref 21–32)
CREAT SERPL-MCNC: 0.79 MG/DL (ref 0.7–1.3)
CRP SERPL-MCNC: 1.45 MG/DL (ref 0–0.6)
GLUCOSE BLD STRIP.AUTO-MCNC: 107 MG/DL (ref 65–117)
GLUCOSE BLD STRIP.AUTO-MCNC: 120 MG/DL (ref 65–117)
GLUCOSE BLD STRIP.AUTO-MCNC: 160 MG/DL (ref 65–117)
GLUCOSE SERPL-MCNC: 123 MG/DL (ref 65–100)
POTASSIUM SERPL-SCNC: 4.3 MMOL/L (ref 3.5–5.1)
SERVICE CMNT-IMP: ABNORMAL
SERVICE CMNT-IMP: ABNORMAL
SERVICE CMNT-IMP: NORMAL
SODIUM SERPL-SCNC: 133 MMOL/L (ref 136–145)

## 2021-12-15 PROCEDURE — 80048 BASIC METABOLIC PNL TOTAL CA: CPT

## 2021-12-15 PROCEDURE — 82962 GLUCOSE BLOOD TEST: CPT

## 2021-12-15 PROCEDURE — 36415 COLL VENOUS BLD VENIPUNCTURE: CPT

## 2021-12-15 PROCEDURE — 74011250636 HC RX REV CODE- 250/636: Performed by: FAMILY MEDICINE

## 2021-12-15 PROCEDURE — 74011250637 HC RX REV CODE- 250/637: Performed by: HOSPITALIST

## 2021-12-15 PROCEDURE — 74011636637 HC RX REV CODE- 636/637: Performed by: HOSPITALIST

## 2021-12-15 PROCEDURE — 86140 C-REACTIVE PROTEIN: CPT

## 2021-12-15 PROCEDURE — 65660000000 HC RM CCU STEPDOWN

## 2021-12-15 PROCEDURE — 74011250637 HC RX REV CODE- 250/637: Performed by: INTERNAL MEDICINE

## 2021-12-15 PROCEDURE — 74011250637 HC RX REV CODE- 250/637: Performed by: FAMILY MEDICINE

## 2021-12-15 RX ADMIN — ENOXAPARIN SODIUM 40 MG: 100 INJECTION SUBCUTANEOUS at 21:39

## 2021-12-15 RX ADMIN — ZINC SULFATE 220 MG (50 MG) CAPSULE 1 CAPSULE: CAPSULE at 10:41

## 2021-12-15 RX ADMIN — BENZONATATE 100 MG: 100 CAPSULE ORAL at 21:39

## 2021-12-15 RX ADMIN — INSULIN GLARGINE 24 UNITS: 100 INJECTION, SOLUTION SUBCUTANEOUS at 10:42

## 2021-12-15 RX ADMIN — GUAIFENESIN AND CODEINE PHOSPHATE 10 ML: 100; 10 SOLUTION ORAL at 17:32

## 2021-12-15 RX ADMIN — DEXAMETHASONE 6 MG: 4 TABLET ORAL at 13:00

## 2021-12-15 RX ADMIN — OXYCODONE HYDROCHLORIDE AND ACETAMINOPHEN 1000 MG: 500 TABLET ORAL at 10:41

## 2021-12-15 RX ADMIN — BENZONATATE 100 MG: 100 CAPSULE ORAL at 17:33

## 2021-12-15 RX ADMIN — Medication 10 ML: at 21:40

## 2021-12-15 RX ADMIN — Medication 10 ML: at 06:00

## 2021-12-15 RX ADMIN — OXYCODONE 10 MG: 5 TABLET ORAL at 21:39

## 2021-12-15 RX ADMIN — GUAIFENESIN AND CODEINE PHOSPHATE 10 ML: 100; 10 SOLUTION ORAL at 10:41

## 2021-12-15 RX ADMIN — OXYCODONE 10 MG: 5 TABLET ORAL at 10:41

## 2021-12-15 RX ADMIN — OXYCODONE HYDROCHLORIDE AND ACETAMINOPHEN 1000 MG: 500 TABLET ORAL at 17:33

## 2021-12-15 RX ADMIN — BENZONATATE 100 MG: 100 CAPSULE ORAL at 10:41

## 2021-12-15 RX ADMIN — ONDANSETRON 4 MG: 2 INJECTION INTRAMUSCULAR; INTRAVENOUS at 10:41

## 2021-12-15 RX ADMIN — OXYCODONE 10 MG: 5 TABLET ORAL at 17:33

## 2021-12-15 RX ADMIN — LISINOPRIL 5 MG: 5 TABLET ORAL at 10:41

## 2021-12-15 RX ADMIN — ENOXAPARIN SODIUM 40 MG: 100 INJECTION SUBCUTANEOUS at 10:42

## 2021-12-15 RX ADMIN — BARICITINIB 4 MG: 2 TABLET, FILM COATED ORAL at 10:42

## 2021-12-15 RX ADMIN — VENLAFAXINE HYDROCHLORIDE 300 MG: 150 CAPSULE, EXTENDED RELEASE ORAL at 10:46

## 2021-12-15 NOTE — PROGRESS NOTES
Bedside and Verbal shift change report given to GERMAN Villanueva (oncoming nurse) by Ike Marin RN (offgoing nurse). Report included the following information SBAR, Kardex, ED Summary, Intake/Output, MAR, Recent Results and Cardiac Rhythm NSR.

## 2021-12-15 NOTE — PROGRESS NOTES
[address] [facility] Adult  Hospitalist Group  Progress note           Primary Care Provider: Other, MD Jamal  Date of Service:  12/10/2021     From H&P      Chidi Ashby is a 39 y.o. male with a pmhx HTN,and TBI who presents with hypoxic respiratory failure 2/2 covid pneumonia  He is unvaccinated. His sx began 4 days ago and include myalgias, arthralgias, cough and fatigue.     In the ED, VS were significant for spo2 88% with improvement to 93%  On 3lpm.  Labs were significant for Na 130, elevated inflammatory markers, and normal d-dimer. Rapid covid was positive. CXR showed non-specific bibasilar airspace disease. EKG shows NSR.     In the ED, he received decaadron, tylenol, toradol, morphine, and IVF.        Subjectively    Breathing about the same as yesterday, burning sensation across his chest, no n/v/d     Assessment and plan    #H acute hypoxic respiratory failure  #Covid pneumonia  Supplemental oxygen to keep SPO2 above 94%. Currently on 4 L/min - continue weaning  Continue Decadron. On baricitinib. Patient declined Remdesevir. Anticoagulation per Covid protocol. D-dimer very low  --CRP elevated, 9.09 on admission, he did not receive Actemra. Contact plus droplet precautions  Other supportive therapy: Antitussives.     #Hypertension  Continue home lisinopril     #Depression  Continue home Effexor     #Type II diabetes. A1c 7.8. Patient initially said he is not diabetic. He now told me he is on Metformin at home. -- Accu-Cheks along with Humalog correction scale  --He will need Metformin on discharge.     #Mild hyponatremia due to acute illness, poor oral intake. Encourage patient to increase oral intake, solid and liquids.   Would like to avoid IV fluids in the setting of Covid with hypoxia requiring oxygen.     Obese  Body mass index is 38.66 kg/m².     CODE STATUS: Full  VTE prophylaxis: Enoxaparin, dosed per COVID protocol  Disposition: Anticipate home, 1-2 days     FUNCTIONAL STATUS PRIOR TO HOSPITALIZATION Ambulates Independently          Review of Systems:     All other review of systems were negative except for that written in the History of Present Illness.           Past Medical History:   Diagnosis Date    Hypertension      Kidney stone      TBI (traumatic brain injury) ([facility] [address]              Past Surgical History:   Procedure Laterality Date    HX LITHOTRIPSY                  Prior to Admission medications    Medication Sig Start Date End Date Taking? Authorizing Provider   venlafaxine HCl (EFFEXOR PO) Take  by mouth daily.     Yes Other, MD Jamal   LISINOPRIL PO Take  by mouth daily.     Yes Other, MD Jamal      No Known Allergies   History reviewed. No pertinent family history.      SOCIAL HISTORY:  Patient resides at Home. Patient ambulates independently  Smoking history: occasional  Alcohol history: occasional     Objective:         Physical Exam:   Visit Vitals  /86   Pulse 96   Temp 98.9 °F (37.2 °C)   Resp 18   Ht 5' 10\" (1.778 m)   Wt 122.2 kg (269 lb 6.4 oz)   SpO2 92%   BMI 38.66 kg/m²      GENERAL:      NAD  HEENT:           Normocephalic, atraumatic, non icteric sclerae, non pallor conjuctivae, EOMs intact, PERRLA. NECK: Supple, trachea midline, no adenopathy, no thyromegally or tenderness, no carotid bruit and no JVD. LUNGS:           CTAB, non-labored respirations on O2    HEART:            S1 and S2 well heard,RRR,  no murmur, click, rub or gallop. ABDOMEB:     Obese, soft, non-tender. Normoactive bowel sounds. No masses,  No organomegaly. EXTREMETIES:          Atraumatic, acyanotic, no edema  PULSES:         2+ and symmetric all extremities. SKIN:    No rashes or lesions  NEUROLOGY:            grossly non-focal           Data Review:    All diagnostic labs and studies have been reviewed.

## 2021-12-15 NOTE — PROGRESS NOTES
Bedside shift change report given to [address]  (oncoming nurse) by Janki De Souza  (offgoing nurse). Report included the following information SBAR, Kardex, MAR and Recent Results.

## 2021-12-15 NOTE — PROGRESS NOTES
4571-patient had minor nose bleed. Lasted about 5 mins  Bleeding has stopped. Patient said this happened on day shift. Will continue to monitor patient.

## 2021-12-16 VITALS
WEIGHT: 257.28 LBS | TEMPERATURE: 98.1 F | HEART RATE: 73 BPM | BODY MASS INDEX: 36.83 KG/M2 | DIASTOLIC BLOOD PRESSURE: 99 MMHG | RESPIRATION RATE: 18 BRPM | OXYGEN SATURATION: 91 % | SYSTOLIC BLOOD PRESSURE: 143 MMHG | HEIGHT: 70 IN

## 2021-12-16 LAB
ANION GAP SERPL CALC-SCNC: 7 MMOL/L (ref 5–15)
BUN SERPL-MCNC: 19 MG/DL (ref 6–20)
BUN/CREAT SERPL: 23 (ref 12–20)
CALCIUM SERPL-MCNC: 8.7 MG/DL (ref 8.5–10.1)
CHLORIDE SERPL-SCNC: 100 MMOL/L (ref 97–108)
CO2 SERPL-SCNC: 24 MMOL/L (ref 21–32)
CREAT SERPL-MCNC: 0.82 MG/DL (ref 0.7–1.3)
ERYTHROCYTE [DISTWIDTH] IN BLOOD BY AUTOMATED COUNT: 12.6 % (ref 11.5–14.5)
GLUCOSE BLD STRIP.AUTO-MCNC: 124 MG/DL (ref 65–117)
GLUCOSE BLD STRIP.AUTO-MCNC: 126 MG/DL (ref 65–117)
GLUCOSE SERPL-MCNC: 126 MG/DL (ref 65–100)
HCT VFR BLD AUTO: 41.7 % (ref 36.6–50.3)
HGB BLD-MCNC: 13.9 G/DL (ref 12.1–17)
MCH RBC QN AUTO: 27.9 PG (ref 26–34)
MCHC RBC AUTO-ENTMCNC: 33.3 G/DL (ref 30–36.5)
MCV RBC AUTO: 83.6 FL (ref 80–99)
NRBC # BLD: 0 K/UL (ref 0–0.01)
NRBC BLD-RTO: 0 PER 100 WBC
PLATELET # BLD AUTO: 661 K/UL (ref 150–400)
PMV BLD AUTO: 9.2 FL (ref 8.9–12.9)
POTASSIUM SERPL-SCNC: 5 MMOL/L (ref 3.5–5.1)
RBC # BLD AUTO: 4.99 M/UL (ref 4.1–5.7)
SERVICE CMNT-IMP: ABNORMAL
SERVICE CMNT-IMP: ABNORMAL
SODIUM SERPL-SCNC: 131 MMOL/L (ref 136–145)
WBC # BLD AUTO: 9.3 K/UL (ref 4.1–11.1)

## 2021-12-16 PROCEDURE — 74011250637 HC RX REV CODE- 250/637: Performed by: HOSPITALIST

## 2021-12-16 PROCEDURE — 74011250636 HC RX REV CODE- 250/636: Performed by: FAMILY MEDICINE

## 2021-12-16 PROCEDURE — 74011250637 HC RX REV CODE- 250/637: Performed by: FAMILY MEDICINE

## 2021-12-16 PROCEDURE — 99232 SBSQ HOSP IP/OBS MODERATE 35: CPT | Performed by: CLINICAL NURSE SPECIALIST

## 2021-12-16 PROCEDURE — 74011250637 HC RX REV CODE- 250/637: Performed by: INTERNAL MEDICINE

## 2021-12-16 PROCEDURE — 82962 GLUCOSE BLOOD TEST: CPT

## 2021-12-16 PROCEDURE — 80048 BASIC METABOLIC PNL TOTAL CA: CPT

## 2021-12-16 PROCEDURE — 90686 IIV4 VACC NO PRSV 0.5 ML IM: CPT | Performed by: FAMILY MEDICINE

## 2021-12-16 PROCEDURE — 85027 COMPLETE CBC AUTOMATED: CPT

## 2021-12-16 PROCEDURE — 90471 IMMUNIZATION ADMIN: CPT

## 2021-12-16 PROCEDURE — 74011636637 HC RX REV CODE- 636/637: Performed by: HOSPITALIST

## 2021-12-16 RX ORDER — VENLAFAXINE HYDROCHLORIDE 150 MG/1
300 CAPSULE, EXTENDED RELEASE ORAL
Qty: 1 CAPSULE | Refills: 0 | Status: SHIPPED
Start: 2021-12-17

## 2021-12-16 RX ORDER — IBUPROFEN 200 MG
CAPSULE ORAL
Qty: 120 STRIP | Refills: 4 | Status: SHIPPED | OUTPATIENT
Start: 2021-12-16

## 2021-12-16 RX ORDER — DEXAMETHASONE 2 MG/1
TABLET ORAL
Qty: 12 TABLET | Refills: 0 | Status: SHIPPED | OUTPATIENT
Start: 2021-12-16 | End: 2021-12-22

## 2021-12-16 RX ORDER — LISINOPRIL 5 MG/1
5 TABLET ORAL DAILY
Qty: 1 TABLET | Refills: 0 | Status: SHIPPED
Start: 2021-12-16

## 2021-12-16 RX ORDER — CODEINE PHOSPHATE AND GUAIFENESIN 10; 100 MG/5ML; MG/5ML
10 SOLUTION ORAL
Qty: 100 ML | Refills: 0 | Status: SHIPPED | OUTPATIENT
Start: 2021-12-16 | End: 2021-12-30

## 2021-12-16 RX ORDER — GLIPIZIDE 5 MG/1
TABLET ORAL
Qty: 28 TABLET | Refills: 0 | Status: SHIPPED | OUTPATIENT
Start: 2021-12-16 | End: 2021-12-22

## 2021-12-16 RX ORDER — INSULIN PUMP SYRINGE, 3 ML
EACH MISCELLANEOUS
Qty: 1 KIT | Refills: 0 | Status: SHIPPED | OUTPATIENT
Start: 2021-12-16

## 2021-12-16 RX ADMIN — INFLUENZA VIRUS VACCINE 0.5 ML: 15; 15; 15; 15 SUSPENSION INTRAMUSCULAR at 13:27

## 2021-12-16 RX ADMIN — DEXAMETHASONE 6 MG: 4 TABLET ORAL at 13:26

## 2021-12-16 RX ADMIN — BARICITINIB 4 MG: 2 TABLET, FILM COATED ORAL at 09:00

## 2021-12-16 RX ADMIN — OXYCODONE 10 MG: 5 TABLET ORAL at 01:52

## 2021-12-16 RX ADMIN — Medication 10 ML: at 14:00

## 2021-12-16 RX ADMIN — INSULIN GLARGINE 24 UNITS: 100 INJECTION, SOLUTION SUBCUTANEOUS at 13:26

## 2021-12-16 RX ADMIN — OXYCODONE HYDROCHLORIDE AND ACETAMINOPHEN 1000 MG: 500 TABLET ORAL at 13:26

## 2021-12-16 RX ADMIN — LISINOPRIL 5 MG: 5 TABLET ORAL at 13:26

## 2021-12-16 RX ADMIN — GUAIFENESIN AND CODEINE PHOSPHATE 10 ML: 100; 10 SOLUTION ORAL at 01:52

## 2021-12-16 RX ADMIN — ZINC SULFATE 220 MG (50 MG) CAPSULE 1 CAPSULE: CAPSULE at 09:00

## 2021-12-16 RX ADMIN — Medication 10 ML: at 07:03

## 2021-12-16 RX ADMIN — BENZONATATE 100 MG: 100 CAPSULE ORAL at 13:26

## 2021-12-16 RX ADMIN — ENOXAPARIN SODIUM 40 MG: 100 INJECTION SUBCUTANEOUS at 13:28

## 2021-12-16 RX ADMIN — VENLAFAXINE HYDROCHLORIDE 300 MG: 150 CAPSULE, EXTENDED RELEASE ORAL at 07:03

## 2021-12-16 NOTE — PROGRESS NOTES
Hospital follow-up PCP transitional care appointment has been scheduled with Dr. Surjit Miller  for Wednesday, 1/19/21 at 8:30 a.m. This is the earliest appointment available with the [facility] in [place], [place]. Pending patient discharge.   Star Xavier, Care Management Specialist.

## 2021-12-16 NOTE — PROGRESS NOTES
Discharge instructions given to patient. Patient verbalized understanding and had no additional questions. Patient Refused wheelchair and NC O2  on transport to main entrance. Patient refused electronic signing. Stated he had to get home. Patient educated on the importance of O2 usage and monitoring of oxygen level. Patient was able to safely walk to                            Main entrance with minimal SOB of breath. Offer to place pt on O2 before getting into vehicle. Wife picking him up. Patient and wife verbalized understanding of O2 therapy and had no additional questions.

## 2021-12-16 NOTE — PROGRESS NOTES
Pulse oximetry assessment   91% at rest on room air (if 88% or less, skip next steps)  87% while ambulating on room air  90*% at rest on 2LPM  90% while ambulating on 2LPM

## 2021-12-16 NOTE — DISCHARGE SUMMARY
Discharge Summary       PATIENT ID: Audelia Izaguirre  MRN: [id number]   YOB: 1980    DATE OF ADMISSION: 12/9/2021 10:22 AM    DATE OF DISCHARGE: 12/16/21 2:15pm   PRIMARY CARE PROVIDER: Ksenia Bonilla MD     ATTENDING PHYSICIAN: SUZAN  DISCHARGING PROVIDER: Denisse Membreno MD    To contact this individual call [phone] and ask the  to page. If unavailable ask to be transferred the Adult Hospitalist Department. CONSULTATIONS: None    PROCEDURES/SURGERIES: * No surgery found *    ADMITTING DIAGNOSES & HOSPITAL COURSE:   Audelia Izaguirre is a 39 y.o. male with a pmhx HTN,and TBI who presents with hypoxic respiratory failure 2/2 covid pneumonia  He is unvaccinated. His sx began 4 days ago and include myalgias, arthralgias, cough and fatigue.   In the ED, VS were significant for spo2 88% with improvement to 93%  On 3lpm.  Labs were significant for Na 130, elevated inflammatory markers, and normal d-dimer. Rapid covid was positive. CXR showed non-specific bibasilar airspace disease. EKG shows NSR.   In the ED, he received decaadron, tylenol, toradol, morphine, and IVF. --Currently on 3 L/min. He is bothered by the cough. Appetite is poor. He confirmed with me given that he does not want Remdesevir    DISCHARGE DIAGNOSES / PLAN:      #H acute hypoxic respiratory failure  #Covid pneumonia  Supplemental oxygen to keep SPO2 above 94%. Currently on RA at rest  -  Continue Decadron. On baricitinib. Patient declined Remdesevir. Anticoagulation per Covid protocol.  D-dimer very low  --CRP elevated, 9.09 on admission, he did not receive Actemra.    Contact plus droplet precautions  Other supportive therapy: Antitussives.   - Oxygen eval prior to DC  Pulse oximetry assessment   91% at rest on room air   87% while ambulating on room air  90*% at rest on 2LPM  90% while ambulating on 2LPM       #Hypertension  stable BP on home lisinopril     #Depression  Continue home Effexor     #Type II diabetes. A1c 7.8. Patient initially said he is not diabetic. He now told me he is on Metformin at home. -- Lantus- with Accu-Cheks along with Humalog correction scale during inpatient   --resume Metformin on discharge and added taper of glipizide for patient to take w steroids on DC.     #Mild hyponatremia due to acute illness, poor oral intake.  Encourage patient to increase oral intake, solid and liquids.  Would like to avoid IV fluids in the setting of Covid with hypoxia requiring oxygen. ADDITIONAL CARE RECOMMENDATIONS:   Discharge recommendations:  Monitor home oxygen levels- return to ER for oxygen < 89%  Continue steroid taper as instructed  Resume home meds including metformin  Take glipizide as instructed while on steroids to minimize high blood sugar levels  Follow up with regular MD for further diabetes management  Recommend getting Pfizer or [employer] vaccine in 2-3 weeks after recovering from your current infection  Wear a mask and Self isolate for 10 more days to reduce the spread of covid    PENDING TEST RESULTS:   At the time of discharge the following test results are still pending: none    FOLLOW UP APPOINTMENTS:    Follow-up Information     Follow up With Specialties Details Why Contact Info    with primary care  Schedule an appointment as soon as possible for a visit for further diabetes management     Other, Phys, MD    Patient can only remember the practice name and not the physician           DIET: Diabetic Diet  ACTIVITY: Activity as tolerated  WOUND CARE: NA  EQUIPMENT needed: oxygen to be set up on DC    DISCHARGE MEDICATIONS:  Current Discharge Medication List      START taking these medications    Details   guaiFENesin-codeine (ROBITUSSIN AC) 100-10 mg/5 mL solution Take 10 mL by mouth every six (6) hours as needed for Cough for up to 14 days. Max Daily Amount: 40 mL.   Qty: 100 mL, Refills: 0  Start date: 12/16/2021, End date: 12/30/2021    Associated Diagnoses: COVID-19 dexAMETHasone (DECADRON) 2 mg tablet Take 3 Tablets by mouth Daily (before breakfast) for 2 days, THEN 2 Tablets Daily (before breakfast) for 2 days, THEN 1 Tablet Daily (before breakfast) for 2 days. Qty: 12 Tablet, Refills: 0  Start date: 12/16/2021, End date: 12/22/2021      glipiZIDE (GLUCOTROL) 5 mg tablet Take 4 Tablets by mouth two (2) times a day for 2 days, THEN 2 Tablets two (2) times a day for 2 days, THEN 1 Tablet two (2) times a day for 2 days. Qty: 28 Tablet, Refills: 0  Start date: 12/16/2021, End date: 12/22/2021      venlafaxine-SR [facility] 150 mg capsule Take 2 Capsules by mouth daily (with breakfast). Qty: 1 Capsule, Refills: 0  Start date: 12/17/2021      Blood-Glucose Meter monitoring kit Use as directed  Qty: 1 Kit, Refills: 0  Start date: 12/16/2021      glucose blood VI test strips (blood glucose test) strip Test 4 times daily as instructed  Qty: 120 Strip, Refills: 4  Start date: 12/16/2021         CONTINUE these medications which have CHANGED    Details   lisinopriL (PRINIVIL, ZESTRIL) 5 mg tablet Take 1 Tablet by mouth daily. Qty: 1 Tablet, Refills: 0  Start date: 12/16/2021         STOP taking these medications       venlafaxine HCl (EFFEXOR PO) Comments:   Reason for Stopping:             NOTIFY YOUR PHYSICIAN FOR ANY OF THE FOLLOWING:   Fever over 101 degrees for 24 hours. Chest pain, shortness of breath, fever, chills, nausea, vomiting, diarrhea, change in mentation, falling, weakness, bleeding. Severe pain or pain not relieved by medications. Or, any other signs or symptoms that you may have questions about.     DISPOSITION:    Home With:   OT  PT  HH  RN       Long term SNF/Inpatient Rehab   X Independent  living    Hospice    Other:       PATIENT CONDITION AT DISCHARGE:     Functional status    Poor     Deconditioned    X Independent      Cognition   X  Lucid     Forgetful     Dementia      Catheters/lines (plus indication)    Wiseman     PICC     PEG    X None Code status   X  Full code     DNR      PHYSICAL EXAMINATION AT DISCHARGE:  Patient Vitals for the past 24 hrs:   Temp Pulse Resp BP SpO2   12/16/21 1400  73      12/16/21 1241 98.1 °F (36.7 °C) 73 18 (!) 143/99 92 %   12/16/21 1200  62      12/16/21 0903 98.7 °F (37.1 °C) 78 16 (!) 150/106 93 %   12/16/21 0600  66      12/16/21 0433 97.7 °F (36.5 °C) 66 19 122/84 91 %   12/16/21 0354  66      12/16/21 0159  69      12/16/21 0059 98.3 °F (36.8 °C) 63 19 130/85 90 %   12/15/21 2200  68      12/15/21 2137 97.7 °F (36.5 °C) 71 19 (!) 139/94 91 %   12/15/21 2000  86      12/15/21 1800  73      12/15/21 1600 98.5 °F (36.9 °C) 72 20 135/83 92 %     General:          Alert, cooperative, no distress, appears stated age. HEENT:           Atraumatic, anicteric sclerae, pink conjunctivae  Neck:               Supple, symmetrical  Lungs:             decreased breath sounds but Clear to auscultation bilaterally. No Wheezing or Rhonchi. No rales. Chest wall:      No tenderness  No Accessory muscle use. Heart:              Regular  rhythm,  No  murmur     Abdomen:        Soft, non-tender. Not distended. Bowel sounds normal  Extremities:     No cyanosis. No clubbing,  non edema  Skin:                Not pale. Not Jaundiced  No rashes   Psych:             Not anxious or agitated. Neurologic:      Alert, moves all extremities, answers questions appropriately and responds to commands   Recent Results (from the past 24 hour(s))   GLUCOSE, POC    Collection Time: 12/15/21  5:07 PM   Result Value Ref Range    Glucose (POC) 160 (H) 65 - 117 mg/dL    Performed by Claudia Alix    CBC W/O DIFF    Collection Time: 12/16/21  1:07 AM   Result Value Ref Range    WBC 9.3 4.1 - 11.1 K/uL    RBC 4.99 4. 10 - 5.70 M/uL    HGB 13.9 12.1 - 17.0 g/dL    HCT 41.7 36.6 - 50.3 %    MCV 83.6 80.0 - 99.0 FL    MCH 27.9 26.0 - 34.0 PG    MCHC 33.3 30.0 - 36.5 g/dL    RDW 12.6 11.5 - 14.5 %    PLATELET 339 (H) 150 - 400 K/uL    MPV 9.2 8.9 - 12.9 FL    NRBC 0.0 0  WBC    ABSOLUTE NRBC 0.00 0.00 - 9.04 K/uL   METABOLIC PANEL, BASIC    Collection Time: 12/16/21  1:07 AM   Result Value Ref Range    Sodium 131 (L) 136 - 145 mmol/L    Potassium 5.0 3.5 - 5.1 mmol/L    Chloride 100 97 - 108 mmol/L    CO2 24 21 - 32 mmol/L    Anion gap 7 5 - 15 mmol/L    Glucose 126 (H) 65 - 100 mg/dL    BUN 19 6 - 20 MG/DL    Creatinine 0.82 0.70 - 1.30 MG/DL    BUN/Creatinine ratio 23 (H) 12 - 20      GFR est AA >60 >60 ml/min/1.73m2    GFR est non-AA >60 >60 ml/min/1.73m2    Calcium 8.7 8.5 - 10.1 MG/DL   GLUCOSE, POC    Collection Time: 12/16/21  6:12 AM   Result Value Ref Range    Glucose (POC) 126 (H) 65 - 117 mg/dL    Performed by Tyree English    GLUCOSE, POC    Collection Time: 12/16/21 12:45 PM   Result Value Ref Range    Glucose (POC) 124 (H) 65 - 117 mg/dL    Performed by [address] [place] [address]:  Problem List as of 12/16/2021 Never Reviewed          Codes Class Noted - Resolved    SOB (shortness of breath) ICD-10-CM: R06.02  ICD-9-CM: 786.05  12/9/2021 - Present              Greater than 30 minutes were spent with the patient on counseling and coordination of care    Signed:   Shorty Rice MD  12/16/2021  2:20 PM   .

## 2021-12-16 NOTE — PROGRESS NOTES
Problem:  Body Temperature -  Risk of, Imbalanced  Goal: Ability to maintain a body temperature within defined limits  Outcome: Progressing Towards Goal

## 2021-12-16 NOTE — PROGRESS NOTES
Transition of Care Plan   RUR- 7% Low Risk    DISPOSITION: The disposition plan is to transition home with family assistance.  DME - Home with home [address]  [phone] - patient accepted.  F/U with PCP/Specialist     Transport: Family - Spouse    At 2:20pm - CM spoke with attending who reports that patient is medically stable to transition home with home oxygen. Order was created and submitted for review via [facility]. Patient's oxygen was delivered to hospital portable tank left outside patient room. Medicare pt has received, reviewed, and signed 2nd IM letter informing them of their right to appeal the discharge. Signed copy has been placed on pt bedside chart. CM will continue to provide updates as they become available. CM will continue to follow, provide support and assist with WILMAR needs as they arise.     NORIS Uribe, CRM, LMHP-e

## 2021-12-16 NOTE — DISCHARGE INSTRUCTIONS
Discharge recommendations:  Monitor home oxygen levels- return to ER for oxygen < 89%  Continue steroid taper as instructed  Resume home meds including metformin  Take glipizide as instructed while on steroids to minimize high blood sugar levels  Follow up with regular MD for further diabetes management  Recommend getting Pfizer or [employer] vaccine in 2-3 weeks after recovering from your current infection  Wear a mask and Self isolate for 10 more days to reduce the spread of covid    Diabetes Management:  1. Take a blood glucose reading once daily - First thing in the morning before you eat or drink anything. Make a log and bring to your next doctor appointment. If your blood sugar is over 200 consistently OR   If your blood sugar is under 100 consistently: call your doctor for a medication adjustment   Goal blood sugar is: _80-130 before meal, <180 2hours after meal_______________    2. Take your diabetes medication as prescribed. Goal A1C is 7%. 3. Make a follow up appointment with your endocrinologist or primary care physician. Please see him within the next 2-4 weeks. 4. Make sure to get a DILATED eye exam every year. This checks for retinal blood vessel damage caused by long term high blood sugar. 5. Make sure to examine your feet every day looking for any wound or foot irritation as sensation can be impaired in your feet. Always wear socks and/or slippers even while at home. This will protect your feet from injury. 6. Diabetes Self Management Training(highly encouraged) : Outpatient appointments are available with a certified diabetic educator who can  you with meal planning, insulin administration and other diabetes management strategies. Please call [phone] to schedule at a location close to your home.

## 2021-12-16 NOTE — DIABETES MGMT
[address] [facility]    CLINICAL NURSE SPECIALIST CONSULT     Initial Presentation   Alan Marin is a 39 y.o. male admitted 12/9/21 with hypoxic respiratory failure 2/2 COVID -19 pneumonia- Unvaccinated- labs with elevated d dimer/ elevated inflammatory markers. CXR consistent with COVID-19 pneumonia. HX:   Past Medical History:   Diagnosis Date    Hypertension     Kidney stone     TBI (traumatic brain injury) ([address]         INITIAL DX:   SOB (shortness of breath) [R06.02]     Current Treatment     TX: steroids/ 02 support/     Consulted by Provider for advanced diabetes nursing assessment and care for:   [x] Inpatient management strategy  [x] Survival skill education    Hospital Course   Clinical progress has been complicated by [id number] pneumonia w/ hypoxic respiratory failure. Diabetes History   New DM2 diagnosis- a1c 7.8 %- [facility] - [facility] labwork in June 2021- A1C >8%    Diabetes-related Medical History  Acute complications  Hyperglycemia-new DM2   Neurological complications  NONE  Microvascular disease  NONE  Macrovascular disease  NONE  Other associated conditions     HTN/ TBI    Diabetes Medication History  Key Antihyperglycemic Medications     Patient is on no antihyperglycemic meds.          ** TAKES METFORMIN BID-does not miss doses**    Diabetes self-management practices: deferred today-unable to reach by phone  Eating pattern-     Physical activity pattern-      Subjective   Briefly interviewed patient re: recent diabetes history  States he's \"been borderline' for sometime  Takes Metformin BID-   States \"I dont' consume much carbs\"      Objective   Physical exam  General Obese/male per BMI    Vital Signs   Visit Vitals  BP (!) 150/106 (BP 1 Location: Left upper arm, BP Patient Position: At rest)   Pulse 78   Temp 98.7 °F (37.1 °C)   Resp 16   Ht 5' 10\" (1.778 m)   Wt 116.7 kg (257 lb 4.4 oz)   SpO2 93%   BMI 36.92 kg/m²       Laboratory  Recent Labs 12/16/21  0107 12/15/21  0444   * 123*   AGAP 7 4*   WBC 9.3  --    CREA 0.82 0.79   GFRNA >60 >60       Factors impacting BG management  Factor Dose Comments   Nutrition:  Standard meals     60 grams/meal      Drugs:  Steroids   Decadron 6mg daily     Impairs insulin action     Pain     Infection COVID-19 pneumonia    Other:   Kidney function  Liver function     WNl  WNL      Blood glucose pattern      Significant diabetes-related events over the past 24-72 hours  Admitting Bg 200  On Decadron 6mg daily  12/16:   Preprandials: <200 consistently   Lantus 24 units daily for steroid induced hyperglycemia-. Correctional insulin : minimal requirements. Assessment and Plan   Nursing Diagnosis Risk for unstable blood glucose pattern   Nursing Intervention Domain 5250 Decision-making Support   Nursing Interventions Examined current inpatient diabetes/blood glucose control   Explored factors facilitating and impeding inpatient management  Explored corrective strategies with patient and responsible inpatient provider   Informed patient of rational for insulin strategy while hospitalized     Nursing Diagnosis 55623 Ineffective Health Management   Nursing Intervention Domain 5250 Decision-makingSupport   Nursing Interventions Identified diabetes self-management practices impeding diabetes control  Discussed diabetes survival skills related to  1. Healthy Plate eating plan; given handouts  2. Role of physical activity in improving insulin sensitivity and action  3. Procedure for blood glucose monitoring & options for ow-cost products available from [facility]   4. Medications plan at discharge     Evaluation   This male  with NEW ONSET Type 2 diabetes, did not achieve diabetes control prior to admission, as evidenced by A1c of 7.8%. Admitted with COVID-19 pneumonia - On daily Decadron . Started on daily Lantus at 0.2u/kg dosing- with improvement in BG trends.   [facility] and [facility] for Metformin in June 2021. Unable to converse with him re: compliance with Metformin. On discharge, This patient would benefit from diabetes self-management education and support [facility]) after discharge. During this hospitalization, the patient has not achieved inpatient blood glucose target of 100-180mg/dl. Several factors have played a role in blood glucose management including:  [x] Critical nature of illness state-COVID-19 pneumonia  [x] Compromised insulin absorption or delivery  [x] Glucocorticoid use- Decadron daily      The Subcutaneous Insulin Order set (9443) has been in use. Hence, the next step in optimizing blood glucose control would be    [x] Implement the Subcutaneous Insulin order set  [x]  Optimize basal insulin dosing   [x]  Add mealtime insulin  (if appropriate)    Blood glucose management would be improved by  [x]  Adding insulin to override impact of steroids      Recommendations     [] Use of Subcutaneous Insulin Order set (1935)  Insulin Dosing Specific recommendation   CONTINUE Basal                                      (Based on weight, BMI & GFR) 24 units Lantus daily (0.2u/kg dosing)    CONTINUE Corrective                                       (Useful in adjusting insulin dosing) [x] Normal sensitivity        Discharge planning   1. RE-start Metformin 1000mg BID-.     2. Since A1C 7.7% despite Metformin, consider adding another medication -Glimepiride 1mg daily -take with largest meal.      3. Prescription for glucometer kit (Meter, Lancets (100), Strips (100)). Patient to obtain a blood glucose reading four times daily. First thing in the morning prior to eating and drinking anything then before lunch, dinner and bedtime. Create a log and present to PCP for interpretation    4. On Discharge, please place an outpatient order for \"diabetes education\" (enter as REF20).   This will trigger a referral for the [facility] which includes outpatient diabetes self management training with a certified diabetes educator. 5. Will educate on diet/ BG monitoring/ importance of taking daily Metformin/ following carb consistent diet and exercise -Provided  diabetes education instructions in AVS and  provided patient with additional written materials-     Billing Code(s)   [H]56940    Before making these care recommendations, I personally reviewed the hospitalization record, including notes, laboratory & diagnostic data and current medications, and examined the patient at the bedside (circumstances permitting) before making care recommendations.      Total minutes: [address], CNS  Diabetes Clinical Nurse Specialist  [facility]  Access via [address] [place] [address]

## 2021-12-17 ENCOUNTER — PATIENT OUTREACH (OUTPATIENT)
Dept: CASE MANAGEMENT | Age: 41
End: 2021-12-17

## 2021-12-17 NOTE — PROGRESS NOTES
12/17/21  CTN unable to reach patient or spouse as both contact numbers are not working numbers. Patient does not have MyChart activated.  CTN will close WILMAR episode---mkrw

## 2022-03-19 PROBLEM — R06.02 SOB (SHORTNESS OF BREATH): Status: ACTIVE | Noted: 2021-12-09

## 2023-05-07 ENCOUNTER — HOSPITAL ENCOUNTER (EMERGENCY)
Facility: HOSPITAL | Age: 43
Discharge: HOME | End: 2023-05-07
Attending: INTERNAL MEDICINE | Admitting: INTERNAL MEDICINE
Payer: OTHER GOVERNMENT

## 2023-05-07 ENCOUNTER — APPOINTMENT (OUTPATIENT)
Facility: HOSPITAL | Age: 43
End: 2023-05-07
Payer: OTHER GOVERNMENT

## 2023-05-07 VITALS
BODY MASS INDEX: 36.85 KG/M2 | HEIGHT: 71 IN | WEIGHT: 263.23 LBS | OXYGEN SATURATION: 93 % | TEMPERATURE: 97.9 F | SYSTOLIC BLOOD PRESSURE: 168 MMHG | RESPIRATION RATE: 16 BRPM | HEART RATE: 81 BPM | DIASTOLIC BLOOD PRESSURE: 84 MMHG

## 2023-05-07 DIAGNOSIS — J06.9 VIRAL URI WITH COUGH: Primary | ICD-10-CM

## 2023-05-07 DIAGNOSIS — J98.01 BRONCHOSPASM: ICD-10-CM

## 2023-05-07 LAB
ALBUMIN SERPL-MCNC: 3.9 G/DL (ref 3.5–5)
ALBUMIN/GLOB SERPL: 1.1 (ref 1.1–2.2)
ALP SERPL-CCNC: 78 U/L (ref 45–117)
ALT SERPL-CCNC: 26 U/L (ref 12–78)
ANION GAP SERPL CALC-SCNC: 8 MMOL/L (ref 5–15)
AST SERPL-CCNC: 14 U/L (ref 15–37)
BASOPHILS # BLD: 0.1 K/UL (ref 0–0.1)
BASOPHILS NFR BLD: 0 % (ref 0–1)
BILIRUB SERPL-MCNC: 0.3 MG/DL (ref 0.2–1)
BUN SERPL-MCNC: 18 MG/DL (ref 6–20)
BUN/CREAT SERPL: 17 (ref 12–20)
CALCIUM SERPL-MCNC: 8.9 MG/DL (ref 8.5–10.1)
CHLORIDE SERPL-SCNC: 101 MMOL/L (ref 97–108)
CO2 SERPL-SCNC: 27 MMOL/L (ref 21–32)
CREAT SERPL-MCNC: 1.05 MG/DL (ref 0.7–1.3)
D DIMER PPP FEU-MCNC: <0.19 MG/L FEU (ref 0–0.65)
DIFFERENTIAL METHOD BLD: ABNORMAL
EOSINOPHIL # BLD: 0 K/UL (ref 0–0.4)
EOSINOPHIL NFR BLD: 0 % (ref 0–7)
ERYTHROCYTE [DISTWIDTH] IN BLOOD BY AUTOMATED COUNT: 13.6 % (ref 11.5–14.5)
GLOBULIN SER CALC-MCNC: 3.4 G/DL (ref 2–4)
GLUCOSE SERPL-MCNC: 286 MG/DL (ref 65–100)
HCT VFR BLD AUTO: 45.5 % (ref 36.6–50.3)
HGB BLD-MCNC: 15.1 G/DL (ref 12.1–17)
IMM GRANULOCYTES # BLD AUTO: 0.1 K/UL (ref 0–0.04)
IMM GRANULOCYTES NFR BLD AUTO: 1 % (ref 0–0.5)
LIPASE SERPL-CCNC: 88 U/L (ref 73–393)
LYMPHOCYTES # BLD: 1.8 K/UL (ref 0.8–3.5)
LYMPHOCYTES NFR BLD: 15 % (ref 12–49)
MCH RBC QN AUTO: 27.5 PG (ref 26–34)
MCHC RBC AUTO-ENTMCNC: 33.2 G/DL (ref 30–36.5)
MCV RBC AUTO: 82.7 FL (ref 80–99)
MONOCYTES # BLD: 0.4 K/UL (ref 0–1)
MONOCYTES NFR BLD: 4 % (ref 5–13)
NEUTS SEG # BLD: 9.5 K/UL (ref 1.8–8)
NEUTS SEG NFR BLD: 80 % (ref 32–75)
NRBC # BLD: 0 K/UL (ref 0–0.01)
NRBC BLD-RTO: 0 PER 100 WBC
PLATELET # BLD AUTO: 321 K/UL (ref 150–400)
PMV BLD AUTO: 10.4 FL (ref 8.9–12.9)
POTASSIUM SERPL-SCNC: 4.3 MMOL/L (ref 3.5–5.1)
PROT SERPL-MCNC: 7.3 G/DL (ref 6.4–8.2)
RBC # BLD AUTO: 5.5 M/UL (ref 4.1–5.7)
SODIUM SERPL-SCNC: 136 MMOL/L (ref 136–145)
TROPONIN I SERPL HS-MCNC: 8 NG/L (ref 0–76)
WBC # BLD AUTO: 11.9 K/UL (ref 4.1–11.1)

## 2023-05-07 PROCEDURE — 80053 COMPREHEN METABOLIC PANEL: CPT

## 2023-05-07 PROCEDURE — 83690 ASSAY OF LIPASE: CPT

## 2023-05-07 PROCEDURE — 85379 FIBRIN DEGRADATION QUANT: CPT

## 2023-05-07 PROCEDURE — 36415 COLL VENOUS BLD VENIPUNCTURE: CPT

## 2023-05-07 PROCEDURE — 93005 ELECTROCARDIOGRAM TRACING: CPT | Performed by: EMERGENCY MEDICINE

## 2023-05-07 PROCEDURE — 85025 COMPLETE CBC W/AUTO DIFF WBC: CPT

## 2023-05-07 PROCEDURE — 84484 ASSAY OF TROPONIN QUANT: CPT

## 2023-05-07 PROCEDURE — 71046 X-RAY EXAM CHEST 2 VIEWS: CPT

## 2023-05-07 PROCEDURE — 99285 EMERGENCY DEPT VISIT HI MDM: CPT

## 2023-05-07 RX ORDER — BENZONATATE 200 MG/1
200 CAPSULE ORAL 3 TIMES DAILY PRN
Qty: 30 CAPSULE | Refills: 0 | Status: SHIPPED | OUTPATIENT
Start: 2023-05-07 | End: 2023-05-14

## 2023-05-07 RX ORDER — GLIPIZIDE 5 MG/1
5 TABLET ORAL
COMMUNITY
Start: 2023-01-08

## 2023-05-07 RX ORDER — CLONIDINE HYDROCHLORIDE 0.1 MG/1
0.1 TABLET ORAL
COMMUNITY
Start: 2022-10-04

## 2023-05-07 RX ORDER — LISINOPRIL 5 MG/1
5 TABLET ORAL DAILY
COMMUNITY
Start: 2021-12-16

## 2023-05-07 RX ORDER — ALBUTEROL SULFATE 90 UG/1
2 AEROSOL, METERED RESPIRATORY (INHALATION) EVERY 6 HOURS PRN
Qty: 18 G | Refills: 3 | Status: SHIPPED | OUTPATIENT
Start: 2023-05-07

## 2023-05-07 RX ORDER — ROSUVASTATIN CALCIUM 40 MG/1
20 TABLET, COATED ORAL
COMMUNITY
Start: 2022-12-02

## 2023-05-07 RX ORDER — ALOGLIPTIN 25 MG/1
25 TABLET, FILM COATED ORAL
COMMUNITY
Start: 2023-03-08

## 2023-05-07 RX ORDER — PREDNISONE 20 MG/1
60 TABLET ORAL
COMMUNITY
Start: 2023-04-27

## 2023-05-07 RX ORDER — VENLAFAXINE HYDROCHLORIDE 150 MG/1
CAPSULE, EXTENDED RELEASE ORAL
COMMUNITY
Start: 2021-12-17

## 2023-05-07 ASSESSMENT — ENCOUNTER SYMPTOMS
BACK PAIN: 0
SHORTNESS OF BREATH: 1
ABDOMINAL PAIN: 0
COUGH: 1
DIARRHEA: 0

## 2023-05-07 ASSESSMENT — LIFESTYLE VARIABLES
HOW MANY STANDARD DRINKS CONTAINING ALCOHOL DO YOU HAVE ON A TYPICAL DAY: 5 OR 6
HOW OFTEN DO YOU HAVE A DRINK CONTAINING ALCOHOL: 2-3 TIMES A WEEK

## 2023-05-07 ASSESSMENT — PAIN - FUNCTIONAL ASSESSMENT: PAIN_FUNCTIONAL_ASSESSMENT: NONE - DENIES PAIN

## 2023-05-07 ASSESSMENT — HEART SCORE
ECG: 0
ECG: 0

## 2023-05-08 LAB
EKG ATRIAL RATE: 82 BPM
EKG DIAGNOSIS: NORMAL
EKG P AXIS: 6 DEGREES
EKG P-R INTERVAL: 156 MS
EKG Q-T INTERVAL: 362 MS
EKG QRS DURATION: 84 MS
EKG QTC CALCULATION (BAZETT): 422 MS
EKG R AXIS: 28 DEGREES
EKG T AXIS: 24 DEGREES
EKG VENTRICULAR RATE: 82 BPM

## 2023-05-08 PROCEDURE — 93010 ELECTROCARDIOGRAM REPORT: CPT | Performed by: INTERNAL MEDICINE

## 2025-07-08 ENCOUNTER — HOSPITAL ENCOUNTER (EMERGENCY)
Facility: HOSPITAL | Age: 45
Discharge: HOME | End: 2025-07-08
Attending: EMERGENCY MEDICINE
Payer: OTHER GOVERNMENT

## 2025-07-08 VITALS
HEIGHT: 70 IN | OXYGEN SATURATION: 93 % | DIASTOLIC BLOOD PRESSURE: 85 MMHG | BODY MASS INDEX: 36.61 KG/M2 | HEART RATE: 87 BPM | RESPIRATION RATE: 18 BRPM | TEMPERATURE: 98.5 F | WEIGHT: 255.73 LBS | SYSTOLIC BLOOD PRESSURE: 135 MMHG

## 2025-07-08 DIAGNOSIS — T78.40XA HYPERSENSITIVITY REACTION, INITIAL ENCOUNTER: Primary | ICD-10-CM

## 2025-07-08 PROCEDURE — 6360000002 HC RX W HCPCS

## 2025-07-08 PROCEDURE — 96374 THER/PROPH/DIAG INJ IV PUSH: CPT

## 2025-07-08 PROCEDURE — 96376 TX/PRO/DX INJ SAME DRUG ADON: CPT

## 2025-07-08 PROCEDURE — 2580000003 HC RX 258

## 2025-07-08 PROCEDURE — 96375 TX/PRO/DX INJ NEW DRUG ADDON: CPT

## 2025-07-08 PROCEDURE — 2500000003 HC RX 250 WO HCPCS

## 2025-07-08 PROCEDURE — 99284 EMERGENCY DEPT VISIT MOD MDM: CPT

## 2025-07-08 PROCEDURE — 96361 HYDRATE IV INFUSION ADD-ON: CPT

## 2025-07-08 RX ORDER — DIPHENHYDRAMINE HYDROCHLORIDE 50 MG/ML
25 INJECTION, SOLUTION INTRAMUSCULAR; INTRAVENOUS
Status: COMPLETED | OUTPATIENT
Start: 2025-07-08 | End: 2025-07-08

## 2025-07-08 RX ORDER — INSULIN GLARGINE 100 [IU]/ML
INJECTION, SOLUTION SUBCUTANEOUS EVERY MORNING
COMMUNITY

## 2025-07-08 RX ORDER — 0.9 % SODIUM CHLORIDE 0.9 %
1000 INTRAVENOUS SOLUTION INTRAVENOUS ONCE
Status: COMPLETED | OUTPATIENT
Start: 2025-07-08 | End: 2025-07-08

## 2025-07-08 RX ADMIN — DIPHENHYDRAMINE HYDROCHLORIDE 25 MG: 50 INJECTION INTRAMUSCULAR; INTRAVENOUS at 17:53

## 2025-07-08 RX ADMIN — METHYLPREDNISOLONE SODIUM SUCCINATE 80 MG: 125 INJECTION, POWDER, LYOPHILIZED, FOR SOLUTION INTRAMUSCULAR; INTRAVENOUS at 17:55

## 2025-07-08 RX ADMIN — FAMOTIDINE 20 MG: 10 INJECTION, SOLUTION INTRAVENOUS at 16:49

## 2025-07-08 RX ADMIN — DIPHENHYDRAMINE HYDROCHLORIDE 25 MG: 50 INJECTION INTRAMUSCULAR; INTRAVENOUS at 16:52

## 2025-07-08 RX ADMIN — SODIUM CHLORIDE 1000 ML: 0.9 INJECTION, SOLUTION INTRAVENOUS at 16:45

## 2025-07-08 RX ADMIN — WATER 40 MG: 1 INJECTION INTRAMUSCULAR; INTRAVENOUS; SUBCUTANEOUS at 16:53

## 2025-07-08 ASSESSMENT — PAIN DESCRIPTION - DESCRIPTORS: DESCRIPTORS: ACHING;THROBBING

## 2025-07-08 ASSESSMENT — PAIN SCALES - GENERAL: PAINLEVEL_OUTOF10: 7

## 2025-07-08 ASSESSMENT — PAIN DESCRIPTION - LOCATION: LOCATION: HEAD

## 2025-07-08 NOTE — ED PROVIDER NOTES
[id number]  [phone]  Go to   If symptoms worsen      DISCHARGE MEDICATIONS:  Discharge Medication List as of 7/8/2025  6:24 PM            (Please note that portions of this note were completed with a voice recognition program.  Efforts were made to edit the dictations but occasionally words are mis-transcribed.)    Kate Landin PA-C (electronically signed)  Emergency Attending Physician / Physician Assistant / Nurse Practitioner             Kate Landin PA-C  07/08/25 1717

## 2025-07-08 NOTE — DISCHARGE INSTRUCTIONS
You were seen today regarding allergic reaction following a bite from a hornet.  You were given IV fluids with a steroid as well as 2 doses of Benadryl.  Please continue to take Benadryl as needed for the next 2 days to help with the complete alleviation of swelling.  Continue to stay hydrated while taking any of these medications.  For any further questions or needed follow-up, you can contact your primary care provider.    Please return to the ED if you experience any continued swelling of the lips and tongue, increased welling around the eye, difficulty swallowing, difficulty breathing, chest pain, shortness of breath, or any loss of consciousness.      We hope that your symptoms continue to alleviate, know that you may have a similar reaction if you were to be stung by a hornet again in the future.    Thank you for trusting us with your care today.

## 2025-07-08 NOTE — ED TRIAGE NOTES
ED triage note: ambulatory with a steady gait accompanied by wife. Patient reports today approx 2 hours ago was cleaning out shed and got stung by hornet on the nose and started experiencing left eye swelling and lips swelling. Denies trouble swallowing, nausea or vomiting. States took motrin 2 hours ago.

## 2025-07-08 NOTE — ED NOTES
Patient discharged in stable condition per MD order. Instructions and follow up discussed with patient. Patient verbalized understanding and has no further questions. AAOX4 NAD noted